# Patient Record
Sex: FEMALE | Race: WHITE | NOT HISPANIC OR LATINO | Employment: OTHER | URBAN - METROPOLITAN AREA
[De-identification: names, ages, dates, MRNs, and addresses within clinical notes are randomized per-mention and may not be internally consistent; named-entity substitution may affect disease eponyms.]

---

## 2018-05-22 ENCOUNTER — LAB VISIT (OUTPATIENT)
Dept: LAB | Facility: HOSPITAL | Age: 73
End: 2018-05-22
Attending: INTERNAL MEDICINE
Payer: MEDICARE

## 2018-05-22 DIAGNOSIS — J44.1 OBSTRUCTIVE CHRONIC BRONCHITIS WITH EXACERBATION: Primary | ICD-10-CM

## 2018-05-22 DIAGNOSIS — E78.5 HYPERLIPEMIA: ICD-10-CM

## 2018-05-22 LAB
ALBUMIN SERPL BCP-MCNC: 4.3 G/DL
ALP SERPL-CCNC: 72 U/L
ALT SERPL W/O P-5'-P-CCNC: 31 U/L
ANION GAP SERPL CALC-SCNC: 10 MMOL/L
AST SERPL-CCNC: 24 U/L
BILIRUB DIRECT SERPL-MCNC: 0.1 MG/DL
BILIRUB SERPL-MCNC: 0.4 MG/DL
BUN SERPL-MCNC: 17 MG/DL
CALCIUM SERPL-MCNC: 8.9 MG/DL
CHLORIDE SERPL-SCNC: 108 MMOL/L
CHOLEST SERPL-MCNC: 181 MG/DL
CHOLEST/HDLC SERPL: 1.5 {RATIO}
CO2 SERPL-SCNC: 22 MMOL/L
CREAT SERPL-MCNC: 0.8 MG/DL
EST. GFR  (AFRICAN AMERICAN): >60 ML/MIN/1.73 M^2
EST. GFR  (NON AFRICAN AMERICAN): >60 ML/MIN/1.73 M^2
GLUCOSE SERPL-MCNC: 98 MG/DL
HDLC SERPL-MCNC: 117 MG/DL
HDLC SERPL: 64.6 %
LDLC SERPL CALC-MCNC: 38.6 MG/DL
NONHDLC SERPL-MCNC: 64 MG/DL
POTASSIUM SERPL-SCNC: 3.2 MMOL/L
PROT SERPL-MCNC: 7.1 G/DL
SODIUM SERPL-SCNC: 140 MMOL/L
TRIGL SERPL-MCNC: 127 MG/DL

## 2018-05-22 PROCEDURE — 80048 BASIC METABOLIC PNL TOTAL CA: CPT

## 2018-05-22 PROCEDURE — 80061 LIPID PANEL: CPT

## 2018-05-22 PROCEDURE — 80076 HEPATIC FUNCTION PANEL: CPT

## 2018-05-22 PROCEDURE — 36415 COLL VENOUS BLD VENIPUNCTURE: CPT

## 2018-05-30 ENCOUNTER — LAB VISIT (OUTPATIENT)
Dept: LAB | Facility: HOSPITAL | Age: 73
End: 2018-05-30
Attending: INTERNAL MEDICINE
Payer: MEDICARE

## 2018-05-30 DIAGNOSIS — J44.1 OBSTRUCTIVE CHRONIC BRONCHITIS WITH EXACERBATION: Primary | ICD-10-CM

## 2018-05-30 LAB
BNP SERPL-MCNC: 112 PG/ML
MAGNESIUM SERPL-MCNC: 1.4 MG/DL
PHENYTOIN SERPL-MCNC: 13.9 UG/ML
POTASSIUM SERPL-SCNC: 3.7 MMOL/L

## 2018-05-30 PROCEDURE — 36415 COLL VENOUS BLD VENIPUNCTURE: CPT

## 2018-05-30 PROCEDURE — 80185 ASSAY OF PHENYTOIN TOTAL: CPT

## 2018-05-30 PROCEDURE — 84132 ASSAY OF SERUM POTASSIUM: CPT

## 2018-05-30 PROCEDURE — 83880 ASSAY OF NATRIURETIC PEPTIDE: CPT

## 2018-05-30 PROCEDURE — 83735 ASSAY OF MAGNESIUM: CPT

## 2018-06-05 ENCOUNTER — LAB VISIT (OUTPATIENT)
Dept: LAB | Facility: HOSPITAL | Age: 73
End: 2018-06-05
Attending: INTERNAL MEDICINE
Payer: MEDICARE

## 2018-06-05 DIAGNOSIS — R19.7 DIARRHEA: Primary | ICD-10-CM

## 2018-06-05 DIAGNOSIS — R10.9 STOMACH ACHE: ICD-10-CM

## 2018-06-05 LAB
BACTERIA #/AREA URNS HPF: ABNORMAL /HPF
BILIRUB UR QL STRIP: NEGATIVE
CLARITY UR: CLEAR
COLOR UR: YELLOW
GLUCOSE UR QL STRIP: NEGATIVE
HGB UR QL STRIP: ABNORMAL
KETONES UR QL STRIP: NEGATIVE
LEUKOCYTE ESTERASE UR QL STRIP: ABNORMAL
MICROSCOPIC COMMENT: ABNORMAL
NITRITE UR QL STRIP: NEGATIVE
NON-SQ EPI CELLS #/AREA URNS HPF: 2 /HPF
PH UR STRIP: 5 [PH] (ref 5–8)
PROT UR QL STRIP: NEGATIVE
RBC #/AREA URNS HPF: 0 /HPF (ref 0–4)
SP GR UR STRIP: 1.01 (ref 1–1.03)
SQUAMOUS #/AREA URNS HPF: 2 /HPF
URN SPEC COLLECT METH UR: ABNORMAL
UROBILINOGEN UR STRIP-ACNC: NEGATIVE EU/DL
WBC #/AREA URNS HPF: 50 /HPF (ref 0–5)

## 2018-06-05 PROCEDURE — 87425 ROTAVIRUS AG IA: CPT

## 2018-06-05 PROCEDURE — 87186 SC STD MICRODIL/AGAR DIL: CPT

## 2018-06-05 PROCEDURE — 87427 SHIGA-LIKE TOXIN AG IA: CPT | Mod: 59

## 2018-06-05 PROCEDURE — 87045 FECES CULTURE AEROBIC BACT: CPT

## 2018-06-05 PROCEDURE — 87088 URINE BACTERIA CULTURE: CPT

## 2018-06-05 PROCEDURE — 87328 CRYPTOSPORIDIUM AG IA: CPT

## 2018-06-05 PROCEDURE — 87046 STOOL CULTR AEROBIC BACT EA: CPT

## 2018-06-05 PROCEDURE — 81000 URINALYSIS NONAUTO W/SCOPE: CPT

## 2018-06-05 PROCEDURE — 87209 SMEAR COMPLEX STAIN: CPT

## 2018-06-05 PROCEDURE — 87086 URINE CULTURE/COLONY COUNT: CPT

## 2018-06-05 PROCEDURE — 87077 CULTURE AEROBIC IDENTIFY: CPT

## 2018-06-05 PROCEDURE — 87449 NOS EACH ORGANISM AG IA: CPT

## 2018-06-05 PROCEDURE — 87301 ADENOVIRUS AG IA: CPT

## 2018-06-06 LAB
C DIFF GDH STL QL: NEGATIVE
C DIFF TOX A+B STL QL IA: NEGATIVE
O+P STL TRI STN: NORMAL
RV AG STL QL IA.RAPID: NEGATIVE

## 2018-06-07 ENCOUNTER — LAB VISIT (OUTPATIENT)
Dept: LAB | Facility: HOSPITAL | Age: 73
End: 2018-06-07
Attending: INTERNAL MEDICINE
Payer: MEDICARE

## 2018-06-07 DIAGNOSIS — D72.10 EOSINOPHILIA: Primary | ICD-10-CM

## 2018-06-07 LAB
CRYPTOSP AG STL QL IA: NEGATIVE
ERYTHROCYTE [DISTWIDTH] IN BLOOD BY AUTOMATED COUNT: 13.5 %
G LAMBLIA AG STL QL IA: NEGATIVE
HCT VFR BLD AUTO: 41.1 %
HGB BLD-MCNC: 13.8 G/DL
MCH RBC QN AUTO: 32.1 PG
MCHC RBC AUTO-ENTMCNC: 33.6 G/DL
MCV RBC AUTO: 96 FL
PLATELET # BLD AUTO: 242 K/UL
PMV BLD AUTO: 9.8 FL
RBC # BLD AUTO: 4.3 M/UL
WBC # BLD AUTO: 7.36 K/UL

## 2018-06-07 PROCEDURE — 36415 COLL VENOUS BLD VENIPUNCTURE: CPT

## 2018-06-07 PROCEDURE — 85027 COMPLETE CBC AUTOMATED: CPT

## 2018-06-08 LAB
BACTERIA UR CULT: NORMAL
E COLI SXT1 STL QL IA: NEGATIVE
E COLI SXT2 STL QL IA: NEGATIVE

## 2018-06-09 LAB — HADV AG STL QL IA: NOT DETECTED

## 2018-06-11 LAB — BACTERIA STL CULT: NORMAL

## 2018-06-19 ENCOUNTER — LAB VISIT (OUTPATIENT)
Dept: LAB | Facility: HOSPITAL | Age: 73
End: 2018-06-19
Attending: INTERNAL MEDICINE
Payer: MEDICARE

## 2018-06-19 DIAGNOSIS — D72.10 EOSINOPHILIA: ICD-10-CM

## 2018-06-19 DIAGNOSIS — E87.6 HYPOPOTASSEMIA: Primary | ICD-10-CM

## 2018-06-19 LAB
ANION GAP SERPL CALC-SCNC: 10 MMOL/L
BNP SERPL-MCNC: 179 PG/ML
BUN SERPL-MCNC: 15 MG/DL
CALCIUM SERPL-MCNC: 9 MG/DL
CHLORIDE SERPL-SCNC: 107 MMOL/L
CO2 SERPL-SCNC: 24 MMOL/L
CREAT SERPL-MCNC: 0.7 MG/DL
ERYTHROCYTE [DISTWIDTH] IN BLOOD BY AUTOMATED COUNT: 13.6 %
EST. GFR  (AFRICAN AMERICAN): >60 ML/MIN/1.73 M^2
EST. GFR  (NON AFRICAN AMERICAN): >60 ML/MIN/1.73 M^2
GLUCOSE SERPL-MCNC: 78 MG/DL
HCT VFR BLD AUTO: 43.6 %
HGB BLD-MCNC: 14.6 G/DL
MAGNESIUM SERPL-MCNC: 1.6 MG/DL
MCH RBC QN AUTO: 32.3 PG
MCHC RBC AUTO-ENTMCNC: 33.5 G/DL
MCV RBC AUTO: 97 FL
PLATELET # BLD AUTO: 255 K/UL
PMV BLD AUTO: 9.7 FL
POTASSIUM SERPL-SCNC: 3.8 MMOL/L
RBC # BLD AUTO: 4.52 M/UL
SODIUM SERPL-SCNC: 141 MMOL/L
WBC # BLD AUTO: 8.4 K/UL

## 2018-06-19 PROCEDURE — 85027 COMPLETE CBC AUTOMATED: CPT

## 2018-06-19 PROCEDURE — 80048 BASIC METABOLIC PNL TOTAL CA: CPT

## 2018-06-19 PROCEDURE — 36415 COLL VENOUS BLD VENIPUNCTURE: CPT

## 2018-06-19 PROCEDURE — 83735 ASSAY OF MAGNESIUM: CPT

## 2018-06-19 PROCEDURE — 83880 ASSAY OF NATRIURETIC PEPTIDE: CPT

## 2018-07-24 ENCOUNTER — LAB VISIT (OUTPATIENT)
Dept: LAB | Facility: HOSPITAL | Age: 73
End: 2018-07-24
Attending: NURSE PRACTITIONER
Payer: MEDICARE

## 2018-07-24 DIAGNOSIS — I50.32 CHRONIC DIASTOLIC HEART FAILURE: ICD-10-CM

## 2018-07-24 DIAGNOSIS — I10 HTN (HYPERTENSION): Primary | ICD-10-CM

## 2018-07-24 LAB — BNP SERPL-MCNC: 85 PG/ML

## 2018-07-24 PROCEDURE — 36415 COLL VENOUS BLD VENIPUNCTURE: CPT

## 2018-07-24 PROCEDURE — 83880 ASSAY OF NATRIURETIC PEPTIDE: CPT

## 2018-08-14 ENCOUNTER — LAB VISIT (OUTPATIENT)
Dept: LAB | Facility: HOSPITAL | Age: 73
End: 2018-08-14
Attending: NURSE PRACTITIONER
Payer: MEDICARE

## 2018-08-14 DIAGNOSIS — E78.00 HYPERCHOLESTEREMIA: ICD-10-CM

## 2018-08-14 DIAGNOSIS — J44.89 CHRONIC BRONCHITIS WITH EMPHYSEMA: ICD-10-CM

## 2018-08-14 DIAGNOSIS — G40.909 EPILEPSY: Primary | ICD-10-CM

## 2018-08-14 LAB
ALBUMIN SERPL BCP-MCNC: 3.9 G/DL
ALBUMIN SERPL BCP-MCNC: 3.9 G/DL
ALP SERPL-CCNC: 78 U/L
ALP SERPL-CCNC: 78 U/L
ALT SERPL W/O P-5'-P-CCNC: 39 U/L
ALT SERPL W/O P-5'-P-CCNC: 39 U/L
ANION GAP SERPL CALC-SCNC: 10 MMOL/L
AST SERPL-CCNC: 32 U/L
AST SERPL-CCNC: 32 U/L
BILIRUB DIRECT SERPL-MCNC: 0.1 MG/DL
BILIRUB SERPL-MCNC: 0.1 MG/DL
BILIRUB SERPL-MCNC: 0.1 MG/DL
BUN SERPL-MCNC: 15 MG/DL
CALCIUM SERPL-MCNC: 9 MG/DL
CHLORIDE SERPL-SCNC: 108 MMOL/L
CHOLEST SERPL-MCNC: 168 MG/DL
CHOLEST/HDLC SERPL: 1.8 {RATIO}
CO2 SERPL-SCNC: 23 MMOL/L
CREAT SERPL-MCNC: 0.6 MG/DL
ERYTHROCYTE [DISTWIDTH] IN BLOOD BY AUTOMATED COUNT: 14 %
EST. GFR  (AFRICAN AMERICAN): >60 ML/MIN/1.73 M^2
EST. GFR  (NON AFRICAN AMERICAN): >60 ML/MIN/1.73 M^2
GLUCOSE SERPL-MCNC: 108 MG/DL
HCT VFR BLD AUTO: 41.3 %
HDLC SERPL-MCNC: 95 MG/DL
HDLC SERPL: 56.5 %
HGB BLD-MCNC: 13.6 G/DL
LDLC SERPL CALC-MCNC: 40.4 MG/DL
MCH RBC QN AUTO: 33 PG
MCHC RBC AUTO-ENTMCNC: 32.9 G/DL
MCV RBC AUTO: 100 FL
NONHDLC SERPL-MCNC: 73 MG/DL
PHENYTOIN SERPL-MCNC: 14.9 UG/ML
PLATELET # BLD AUTO: 304 K/UL
PMV BLD AUTO: 9.7 FL
POTASSIUM SERPL-SCNC: 3.6 MMOL/L
PROT SERPL-MCNC: 7 G/DL
PROT SERPL-MCNC: 7 G/DL
RBC # BLD AUTO: 4.12 M/UL
SODIUM SERPL-SCNC: 141 MMOL/L
TRIGL SERPL-MCNC: 163 MG/DL
WBC # BLD AUTO: 7.22 K/UL

## 2018-08-14 PROCEDURE — 36415 COLL VENOUS BLD VENIPUNCTURE: CPT

## 2018-08-14 PROCEDURE — 80053 COMPREHEN METABOLIC PANEL: CPT

## 2018-08-14 PROCEDURE — 80185 ASSAY OF PHENYTOIN TOTAL: CPT

## 2018-08-14 PROCEDURE — 85027 COMPLETE CBC AUTOMATED: CPT

## 2018-08-14 PROCEDURE — 80061 LIPID PANEL: CPT

## 2018-10-01 ENCOUNTER — APPOINTMENT (OUTPATIENT)
Dept: LAB | Facility: HOSPITAL | Age: 73
End: 2018-10-01
Attending: INTERNAL MEDICINE
Payer: MEDICARE

## 2018-10-01 DIAGNOSIS — N39.0 UTI (URINARY TRACT INFECTION): Primary | ICD-10-CM

## 2018-10-01 LAB
BILIRUB UR QL STRIP: NEGATIVE
CLARITY UR: CLEAR
COLOR UR: YELLOW
GLUCOSE UR QL STRIP: NEGATIVE
HGB UR QL STRIP: NEGATIVE
KETONES UR QL STRIP: NEGATIVE
LEUKOCYTE ESTERASE UR QL STRIP: NEGATIVE
NITRITE UR QL STRIP: NEGATIVE
PH UR STRIP: 6 [PH] (ref 5–8)
PROT UR QL STRIP: NEGATIVE
SP GR UR STRIP: <=1.005 (ref 1–1.03)
URN SPEC COLLECT METH UR: NORMAL
UROBILINOGEN UR STRIP-ACNC: NEGATIVE EU/DL

## 2018-10-01 PROCEDURE — 81003 URINALYSIS AUTO W/O SCOPE: CPT

## 2018-10-01 PROCEDURE — 87086 URINE CULTURE/COLONY COUNT: CPT

## 2018-10-03 LAB — BACTERIA UR CULT: NORMAL

## 2018-11-06 ENCOUNTER — LAB VISIT (OUTPATIENT)
Dept: LAB | Facility: HOSPITAL | Age: 73
End: 2018-11-06
Attending: INTERNAL MEDICINE
Payer: MEDICARE

## 2018-11-06 DIAGNOSIS — J44.89 OBSTRUCTIVE CHRONIC BRONCHITIS WITHOUT EXACERBATION: ICD-10-CM

## 2018-11-06 DIAGNOSIS — G40.901 STATUS EPILEPTICUS: Primary | ICD-10-CM

## 2018-11-06 DIAGNOSIS — E78.5 HYPERLIPEMIA: ICD-10-CM

## 2018-11-06 LAB
ALBUMIN SERPL BCP-MCNC: 4 G/DL
ALP SERPL-CCNC: 79 U/L
ALT SERPL W/O P-5'-P-CCNC: 28 U/L
ANION GAP SERPL CALC-SCNC: 9 MMOL/L
AST SERPL-CCNC: 27 U/L
BILIRUB DIRECT SERPL-MCNC: 0.1 MG/DL
BILIRUB SERPL-MCNC: 0.3 MG/DL
BUN SERPL-MCNC: 16 MG/DL
CALCIUM SERPL-MCNC: 8.7 MG/DL
CHLORIDE SERPL-SCNC: 109 MMOL/L
CHOLEST SERPL-MCNC: 176 MG/DL
CHOLEST/HDLC SERPL: 1.6 {RATIO}
CO2 SERPL-SCNC: 23 MMOL/L
CREAT SERPL-MCNC: 0.7 MG/DL
EST. GFR  (AFRICAN AMERICAN): >60 ML/MIN/1.73 M^2
EST. GFR  (NON AFRICAN AMERICAN): >60 ML/MIN/1.73 M^2
GLUCOSE SERPL-MCNC: 73 MG/DL
HDLC SERPL-MCNC: 108 MG/DL
HDLC SERPL: 61.4 %
LDLC SERPL CALC-MCNC: 51.2 MG/DL
NONHDLC SERPL-MCNC: 68 MG/DL
PHENYTOIN SERPL-MCNC: 6.9 UG/ML
POTASSIUM SERPL-SCNC: 3.2 MMOL/L
PROT SERPL-MCNC: 6.9 G/DL
SODIUM SERPL-SCNC: 141 MMOL/L
TRIGL SERPL-MCNC: 84 MG/DL

## 2018-11-06 PROCEDURE — 80061 LIPID PANEL: CPT

## 2018-11-06 PROCEDURE — 80185 ASSAY OF PHENYTOIN TOTAL: CPT

## 2018-11-06 PROCEDURE — 80076 HEPATIC FUNCTION PANEL: CPT

## 2018-11-06 PROCEDURE — 36415 COLL VENOUS BLD VENIPUNCTURE: CPT

## 2018-11-06 PROCEDURE — 80048 BASIC METABOLIC PNL TOTAL CA: CPT

## 2018-11-08 ENCOUNTER — APPOINTMENT (OUTPATIENT)
Dept: LAB | Facility: HOSPITAL | Age: 73
End: 2018-11-08
Attending: INTERNAL MEDICINE
Payer: MEDICARE

## 2018-11-08 DIAGNOSIS — N39.0 URINARY TRACT INFECTION, SITE NOT SPECIFIED: Primary | ICD-10-CM

## 2018-11-08 LAB
BACTERIA #/AREA URNS HPF: ABNORMAL /HPF
BILIRUB UR QL STRIP: NEGATIVE
CLARITY UR: ABNORMAL
COLOR UR: YELLOW
GLUCOSE UR QL STRIP: NEGATIVE
HGB UR QL STRIP: ABNORMAL
HYALINE CASTS #/AREA URNS LPF: 0 /LPF
KETONES UR QL STRIP: ABNORMAL
LEUKOCYTE ESTERASE UR QL STRIP: ABNORMAL
MICROSCOPIC COMMENT: ABNORMAL
NITRITE UR QL STRIP: POSITIVE
PH UR STRIP: 6 [PH] (ref 5–8)
PROT UR QL STRIP: ABNORMAL
RBC #/AREA URNS HPF: 10 /HPF (ref 0–4)
SP GR UR STRIP: >1.03 (ref 1–1.03)
URN SPEC COLLECT METH UR: ABNORMAL
UROBILINOGEN UR STRIP-ACNC: NEGATIVE EU/DL
WBC #/AREA URNS HPF: >100 /HPF (ref 0–5)

## 2018-11-08 PROCEDURE — 87186 SC STD MICRODIL/AGAR DIL: CPT

## 2018-11-08 PROCEDURE — 81000 URINALYSIS NONAUTO W/SCOPE: CPT

## 2018-11-08 PROCEDURE — 87086 URINE CULTURE/COLONY COUNT: CPT

## 2018-11-08 PROCEDURE — 87077 CULTURE AEROBIC IDENTIFY: CPT

## 2018-11-08 PROCEDURE — 87088 URINE BACTERIA CULTURE: CPT

## 2018-11-10 LAB — BACTERIA UR CULT: NORMAL

## 2019-02-19 ENCOUNTER — LAB VISIT (OUTPATIENT)
Dept: LAB | Facility: HOSPITAL | Age: 74
End: 2019-02-19
Attending: NURSE PRACTITIONER
Payer: MEDICARE

## 2019-02-19 DIAGNOSIS — R53.1 ASTHENIA: ICD-10-CM

## 2019-02-19 DIAGNOSIS — R53.81 DEBILITY: Primary | ICD-10-CM

## 2019-02-19 LAB
ERYTHROCYTE [DISTWIDTH] IN BLOOD BY AUTOMATED COUNT: 13.3 %
HCT VFR BLD AUTO: 41.8 %
HGB BLD-MCNC: 13.7 G/DL
MCH RBC QN AUTO: 32.4 PG
MCHC RBC AUTO-ENTMCNC: 32.8 G/DL
MCV RBC AUTO: 99 FL
PLATELET # BLD AUTO: 238 K/UL
PMV BLD AUTO: 9.5 FL
RBC # BLD AUTO: 4.23 M/UL
WBC # BLD AUTO: 6.95 K/UL

## 2019-02-19 PROCEDURE — 85027 COMPLETE CBC AUTOMATED: CPT

## 2019-02-19 PROCEDURE — 36415 COLL VENOUS BLD VENIPUNCTURE: CPT

## 2019-02-21 ENCOUNTER — LAB VISIT (OUTPATIENT)
Dept: LAB | Facility: HOSPITAL | Age: 74
End: 2019-02-21
Attending: INTERNAL MEDICINE
Payer: MEDICARE

## 2019-02-21 DIAGNOSIS — G40.89 SOMATOSENSORY ATTACKS: ICD-10-CM

## 2019-02-21 DIAGNOSIS — K21.9 ESOPHAGEAL REFLUX: Primary | ICD-10-CM

## 2019-02-21 DIAGNOSIS — E78.5 HYPERLIPEMIA: ICD-10-CM

## 2019-02-21 LAB
ALBUMIN SERPL BCP-MCNC: 4 G/DL
ALP SERPL-CCNC: 82 U/L
ALT SERPL W/O P-5'-P-CCNC: 20 U/L
ANION GAP SERPL CALC-SCNC: 13 MMOL/L
AST SERPL-CCNC: 23 U/L
BILIRUB DIRECT SERPL-MCNC: 0.1 MG/DL
BILIRUB SERPL-MCNC: 0.5 MG/DL
BUN SERPL-MCNC: 13 MG/DL
CALCIUM SERPL-MCNC: 8.7 MG/DL
CHLORIDE SERPL-SCNC: 102 MMOL/L
CHOLEST SERPL-MCNC: 202 MG/DL
CHOLEST/HDLC SERPL: 2 {RATIO}
CO2 SERPL-SCNC: 26 MMOL/L
CREAT SERPL-MCNC: 0.8 MG/DL
EST. GFR  (AFRICAN AMERICAN): >60 ML/MIN/1.73 M^2
EST. GFR  (NON AFRICAN AMERICAN): >60 ML/MIN/1.73 M^2
GLUCOSE SERPL-MCNC: 114 MG/DL
HDLC SERPL-MCNC: 101 MG/DL
HDLC SERPL: 50 %
LDLC SERPL CALC-MCNC: 71.6 MG/DL
NONHDLC SERPL-MCNC: 101 MG/DL
PHENYTOIN SERPL-MCNC: 14.4 UG/ML
POTASSIUM SERPL-SCNC: 2.9 MMOL/L
PROT SERPL-MCNC: 6.9 G/DL
SODIUM SERPL-SCNC: 141 MMOL/L
TRIGL SERPL-MCNC: 147 MG/DL

## 2019-02-21 PROCEDURE — 80061 LIPID PANEL: CPT

## 2019-02-21 PROCEDURE — 80076 HEPATIC FUNCTION PANEL: CPT

## 2019-02-21 PROCEDURE — 36415 COLL VENOUS BLD VENIPUNCTURE: CPT

## 2019-02-21 PROCEDURE — 80185 ASSAY OF PHENYTOIN TOTAL: CPT

## 2019-02-21 PROCEDURE — 80048 BASIC METABOLIC PNL TOTAL CA: CPT

## 2019-02-28 ENCOUNTER — LAB VISIT (OUTPATIENT)
Dept: LAB | Facility: HOSPITAL | Age: 74
End: 2019-02-28
Attending: NURSE PRACTITIONER
Payer: MEDICARE

## 2019-02-28 DIAGNOSIS — E87.6 HYPOPOTASSEMIA: Primary | ICD-10-CM

## 2019-02-28 LAB
ANION GAP SERPL CALC-SCNC: 10 MMOL/L
BUN SERPL-MCNC: 13 MG/DL
CALCIUM SERPL-MCNC: 8.3 MG/DL
CHLORIDE SERPL-SCNC: 107 MMOL/L
CO2 SERPL-SCNC: 22 MMOL/L
CREAT SERPL-MCNC: 0.6 MG/DL
EST. GFR  (AFRICAN AMERICAN): >60 ML/MIN/1.73 M^2
EST. GFR  (NON AFRICAN AMERICAN): >60 ML/MIN/1.73 M^2
GLUCOSE SERPL-MCNC: 84 MG/DL
POTASSIUM SERPL-SCNC: 3.4 MMOL/L
SODIUM SERPL-SCNC: 139 MMOL/L

## 2019-02-28 PROCEDURE — 36415 COLL VENOUS BLD VENIPUNCTURE: CPT

## 2019-02-28 PROCEDURE — 80048 BASIC METABOLIC PNL TOTAL CA: CPT

## 2019-03-06 ENCOUNTER — APPOINTMENT (OUTPATIENT)
Dept: LAB | Facility: HOSPITAL | Age: 74
End: 2019-03-06
Attending: INTERNAL MEDICINE
Payer: MEDICARE

## 2019-03-06 LAB
BILIRUB UR QL STRIP: NEGATIVE
CLARITY UR: CLEAR
COLOR UR: YELLOW
GLUCOSE UR QL STRIP: NEGATIVE
HGB UR QL STRIP: NEGATIVE
KETONES UR QL STRIP: NEGATIVE
LEUKOCYTE ESTERASE UR QL STRIP: NEGATIVE
NITRITE UR QL STRIP: NEGATIVE
PH UR STRIP: 6 [PH] (ref 5–8)
PROT UR QL STRIP: ABNORMAL
SP GR UR STRIP: >=1.03 (ref 1–1.03)
URN SPEC COLLECT METH UR: ABNORMAL
UROBILINOGEN UR STRIP-ACNC: NEGATIVE EU/DL

## 2019-03-06 PROCEDURE — 87086 URINE CULTURE/COLONY COUNT: CPT

## 2019-03-06 PROCEDURE — 81003 URINALYSIS AUTO W/O SCOPE: CPT

## 2019-03-07 ENCOUNTER — LAB VISIT (OUTPATIENT)
Dept: LAB | Facility: HOSPITAL | Age: 74
End: 2019-03-07
Attending: NURSE PRACTITIONER
Payer: MEDICARE

## 2019-03-07 DIAGNOSIS — E87.6 HYPOPOTASSEMIA: Primary | ICD-10-CM

## 2019-03-07 LAB
ANION GAP SERPL CALC-SCNC: 14 MMOL/L
BUN SERPL-MCNC: 14 MG/DL
CALCIUM SERPL-MCNC: 8.5 MG/DL
CHLORIDE SERPL-SCNC: 106 MMOL/L
CO2 SERPL-SCNC: 21 MMOL/L
CREAT SERPL-MCNC: 0.6 MG/DL
EST. GFR  (AFRICAN AMERICAN): >60 ML/MIN/1.73 M^2
EST. GFR  (NON AFRICAN AMERICAN): >60 ML/MIN/1.73 M^2
GLUCOSE SERPL-MCNC: 82 MG/DL
POTASSIUM SERPL-SCNC: 3.6 MMOL/L
SODIUM SERPL-SCNC: 141 MMOL/L

## 2019-03-07 PROCEDURE — 80048 BASIC METABOLIC PNL TOTAL CA: CPT

## 2019-03-07 PROCEDURE — 36415 COLL VENOUS BLD VENIPUNCTURE: CPT

## 2019-03-08 LAB
BACTERIA UR CULT: NORMAL
BACTERIA UR CULT: NORMAL

## 2019-03-13 ENCOUNTER — LAB VISIT (OUTPATIENT)
Dept: LAB | Facility: HOSPITAL | Age: 74
End: 2019-03-13
Attending: NURSE PRACTITIONER
Payer: MEDICARE

## 2019-03-13 DIAGNOSIS — R76.12 NONSPECIFIC REACTION TO CELL MEDIATED IMMUNITY MEASUREMENT OF GAMMA INTERFERON ANTIGEN RESPONSE WITHOUT ACTIVE TUBERCULOSIS: ICD-10-CM

## 2019-03-13 DIAGNOSIS — J43.9 EMPHYSEMATOUS BLEB: Primary | ICD-10-CM

## 2019-03-13 DIAGNOSIS — J44.89 OBSTRUCTIVE CHRONIC BRONCHITIS WITHOUT EXACERBATION: ICD-10-CM

## 2019-03-13 PROCEDURE — 87502 INFLUENZA DNA AMP PROBE: CPT

## 2019-03-14 ENCOUNTER — LAB VISIT (OUTPATIENT)
Dept: LAB | Facility: HOSPITAL | Age: 74
End: 2019-03-14
Attending: NURSE PRACTITIONER
Payer: MEDICARE

## 2019-03-14 DIAGNOSIS — T78.40XA DILATED CARDIOMYOPATHY SECONDARY TO SENSITIVITY: ICD-10-CM

## 2019-03-14 DIAGNOSIS — J43.9 EMPHYSEMATOUS BLEB: Primary | ICD-10-CM

## 2019-03-14 DIAGNOSIS — R76.12 NONSPECIFIC REACTION TO CELL MEDIATED IMMUNITY MEASUREMENT OF GAMMA INTERFERON ANTIGEN RESPONSE WITHOUT ACTIVE TUBERCULOSIS: ICD-10-CM

## 2019-03-14 DIAGNOSIS — I43 DILATED CARDIOMYOPATHY SECONDARY TO SENSITIVITY: ICD-10-CM

## 2019-03-14 DIAGNOSIS — J44.89 OBSTRUCTIVE CHRONIC BRONCHITIS WITHOUT EXACERBATION: ICD-10-CM

## 2019-03-14 LAB
BNP SERPL-MCNC: 169 PG/ML
INFLUENZA A, MOLECULAR: NEGATIVE
INFLUENZA B, MOLECULAR: NEGATIVE
SPECIMEN SOURCE: NORMAL

## 2019-03-14 PROCEDURE — 36415 COLL VENOUS BLD VENIPUNCTURE: CPT

## 2019-03-14 PROCEDURE — 83880 ASSAY OF NATRIURETIC PEPTIDE: CPT

## 2019-03-21 ENCOUNTER — LAB VISIT (OUTPATIENT)
Dept: LAB | Facility: HOSPITAL | Age: 74
End: 2019-03-21
Attending: INTERNAL MEDICINE
Payer: MEDICARE

## 2019-03-21 DIAGNOSIS — N39.0 URINARY TRACT INFECTION, SITE NOT SPECIFIED: Primary | ICD-10-CM

## 2019-03-21 LAB
AMORPH CRY URNS QL MICRO: ABNORMAL
BACTERIA #/AREA URNS HPF: ABNORMAL /HPF
BILIRUB UR QL STRIP: NEGATIVE
CLARITY UR: CLEAR
COLOR UR: YELLOW
GLUCOSE UR QL STRIP: NEGATIVE
HGB UR QL STRIP: NEGATIVE
HYALINE CASTS #/AREA URNS LPF: 3 /LPF
KETONES UR QL STRIP: NEGATIVE
LEUKOCYTE ESTERASE UR QL STRIP: ABNORMAL
MICROSCOPIC COMMENT: ABNORMAL
NITRITE UR QL STRIP: NEGATIVE
NON-SQ EPI CELLS #/AREA URNS HPF: 3 /HPF
PH UR STRIP: 5 [PH] (ref 5–8)
PROT UR QL STRIP: NEGATIVE
SP GR UR STRIP: 1.01 (ref 1–1.03)
URN SPEC COLLECT METH UR: ABNORMAL
UROBILINOGEN UR STRIP-ACNC: NEGATIVE EU/DL
WBC #/AREA URNS HPF: 3 /HPF (ref 0–5)

## 2019-03-21 PROCEDURE — 87086 URINE CULTURE/COLONY COUNT: CPT

## 2019-03-21 PROCEDURE — 81000 URINALYSIS NONAUTO W/SCOPE: CPT

## 2019-03-22 LAB — BACTERIA UR CULT: NO GROWTH

## 2019-03-23 ENCOUNTER — HOSPITAL ENCOUNTER (EMERGENCY)
Facility: HOSPITAL | Age: 74
Discharge: HOME | End: 2019-03-23
Attending: EMERGENCY MEDICINE
Payer: MEDICARE

## 2019-03-23 VITALS
WEIGHT: 102.38 LBS | HEIGHT: 63 IN | BODY MASS INDEX: 18.14 KG/M2 | DIASTOLIC BLOOD PRESSURE: 56 MMHG | RESPIRATION RATE: 13 BRPM | OXYGEN SATURATION: 96 % | HEART RATE: 90 BPM | SYSTOLIC BLOOD PRESSURE: 113 MMHG | TEMPERATURE: 99 F

## 2019-03-23 DIAGNOSIS — D72.829 LEUKOCYTOSIS, UNSPECIFIED TYPE: ICD-10-CM

## 2019-03-23 DIAGNOSIS — R11.0 NAUSEA: ICD-10-CM

## 2019-03-23 DIAGNOSIS — J18.9 PNEUMONIA OF RIGHT LOWER LOBE DUE TO INFECTIOUS ORGANISM: Primary | ICD-10-CM

## 2019-03-23 LAB
ALBUMIN SERPL BCP-MCNC: 3.6 G/DL
ALP SERPL-CCNC: 71 U/L
ALT SERPL W/O P-5'-P-CCNC: 19 U/L
ANION GAP SERPL CALC-SCNC: 10 MMOL/L
APTT BLDCRRT: 27.9 SEC
AST SERPL-CCNC: 16 U/L
BASOPHILS # BLD AUTO: 0.04 K/UL
BASOPHILS NFR BLD: 0.2 %
BILIRUB SERPL-MCNC: 1 MG/DL
BNP SERPL-MCNC: 705 PG/ML
BUN SERPL-MCNC: 26 MG/DL
CALCIUM SERPL-MCNC: 8.5 MG/DL
CHLORIDE SERPL-SCNC: 97 MMOL/L
CO2 SERPL-SCNC: 24 MMOL/L
CREAT SERPL-MCNC: 1 MG/DL
DIFFERENTIAL METHOD: ABNORMAL
EOSINOPHIL # BLD AUTO: 0 K/UL
EOSINOPHIL NFR BLD: 0.1 %
ERYTHROCYTE [DISTWIDTH] IN BLOOD BY AUTOMATED COUNT: 13.1 %
EST. GFR  (AFRICAN AMERICAN): >60 ML/MIN/1.73 M^2
EST. GFR  (NON AFRICAN AMERICAN): 55.2 ML/MIN/1.73 M^2
GLUCOSE SERPL-MCNC: 111 MG/DL
HCT VFR BLD AUTO: 42.1 %
HGB BLD-MCNC: 13.8 G/DL
IMM GRANULOCYTES # BLD AUTO: 0.16 K/UL
IMM GRANULOCYTES NFR BLD AUTO: 0.7 %
INFLUENZA A, MOLECULAR: NEGATIVE
INFLUENZA B, MOLECULAR: NEGATIVE
INR PPP: 1.3
LACTATE SERPL-SCNC: 1.8 MMOL/L
LIPASE SERPL-CCNC: 23 U/L
LYMPHOCYTES # BLD AUTO: 1.7 K/UL
LYMPHOCYTES NFR BLD: 7.5 %
MAGNESIUM SERPL-MCNC: 1.7 MG/DL
MCH RBC QN AUTO: 32.5 PG
MCHC RBC AUTO-ENTMCNC: 32.8 G/DL
MCV RBC AUTO: 99 FL
MONOCYTES # BLD AUTO: 1.1 K/UL
MONOCYTES NFR BLD: 4.7 %
NEUTROPHILS # BLD AUTO: 19.5 K/UL
NEUTROPHILS NFR BLD: 86.8 %
NRBC BLD-RTO: 0 /100 WBC
PLATELET # BLD AUTO: 249 K/UL
PMV BLD AUTO: 9 FL
POTASSIUM SERPL-SCNC: 3.5 MMOL/L
PROT SERPL-MCNC: 7 G/DL
PROTHROMBIN TIME: 14.2 SEC
RBC # BLD AUTO: 4.25 M/UL
SODIUM SERPL-SCNC: 131 MMOL/L
SPECIMEN SOURCE: NORMAL
TROPONIN I SERPL DL<=0.01 NG/ML-MCNC: 0.06 NG/ML
WBC # BLD AUTO: 22.46 K/UL

## 2019-03-23 PROCEDURE — 71045 X-RAY EXAM CHEST 1 VIEW: CPT | Mod: TC,FY

## 2019-03-23 PROCEDURE — 99285 EMERGENCY DEPT VISIT HI MDM: CPT | Mod: 25

## 2019-03-23 PROCEDURE — 85610 PROTHROMBIN TIME: CPT

## 2019-03-23 PROCEDURE — 71045 XR CHEST AP PORTABLE: ICD-10-PCS | Mod: 26,,, | Performed by: RADIOLOGY

## 2019-03-23 PROCEDURE — 93005 ELECTROCARDIOGRAM TRACING: CPT

## 2019-03-23 PROCEDURE — 87040 BLOOD CULTURE FOR BACTERIA: CPT

## 2019-03-23 PROCEDURE — 80053 COMPREHEN METABOLIC PANEL: CPT

## 2019-03-23 PROCEDURE — 74176 CT ABD & PELVIS W/O CONTRAST: CPT | Mod: 26,,, | Performed by: RADIOLOGY

## 2019-03-23 PROCEDURE — 25000003 PHARM REV CODE 250: Performed by: EMERGENCY MEDICINE

## 2019-03-23 PROCEDURE — 87502 INFLUENZA DNA AMP PROBE: CPT

## 2019-03-23 PROCEDURE — 96361 HYDRATE IV INFUSION ADD-ON: CPT

## 2019-03-23 PROCEDURE — 96365 THER/PROPH/DIAG IV INF INIT: CPT

## 2019-03-23 PROCEDURE — 84484 ASSAY OF TROPONIN QUANT: CPT

## 2019-03-23 PROCEDURE — 85730 THROMBOPLASTIN TIME PARTIAL: CPT

## 2019-03-23 PROCEDURE — 63600175 PHARM REV CODE 636 W HCPCS: Performed by: EMERGENCY MEDICINE

## 2019-03-23 PROCEDURE — 74176 CT ABD & PELVIS W/O CONTRAST: CPT | Mod: TC

## 2019-03-23 PROCEDURE — 83880 ASSAY OF NATRIURETIC PEPTIDE: CPT

## 2019-03-23 PROCEDURE — 74176 CT RENAL STONE STUDY ABD PELVIS WO: ICD-10-PCS | Mod: 26,,, | Performed by: RADIOLOGY

## 2019-03-23 PROCEDURE — 83690 ASSAY OF LIPASE: CPT

## 2019-03-23 PROCEDURE — 36415 COLL VENOUS BLD VENIPUNCTURE: CPT

## 2019-03-23 PROCEDURE — 85025 COMPLETE CBC W/AUTO DIFF WBC: CPT

## 2019-03-23 PROCEDURE — 71045 X-RAY EXAM CHEST 1 VIEW: CPT | Mod: 26,,, | Performed by: RADIOLOGY

## 2019-03-23 PROCEDURE — 83735 ASSAY OF MAGNESIUM: CPT

## 2019-03-23 PROCEDURE — 83605 ASSAY OF LACTIC ACID: CPT

## 2019-03-23 RX ORDER — IPRATROPIUM BROMIDE AND ALBUTEROL SULFATE 2.5; .5 MG/3ML; MG/3ML
3 SOLUTION RESPIRATORY (INHALATION) EVERY 6 HOURS PRN
COMMUNITY

## 2019-03-23 RX ORDER — EZETIMIBE 10 MG/1
10 TABLET ORAL DAILY
COMMUNITY

## 2019-03-23 RX ORDER — FERROUS SULFATE, DRIED 160(50) MG
1 TABLET, EXTENDED RELEASE ORAL 2 TIMES DAILY WITH MEALS
COMMUNITY

## 2019-03-23 RX ORDER — ESCITALOPRAM OXALATE 5 MG/1
5 TABLET ORAL DAILY
COMMUNITY

## 2019-03-23 RX ORDER — POTASSIUM CHLORIDE 20 MEQ/1
40 TABLET, EXTENDED RELEASE ORAL 2 TIMES DAILY
COMMUNITY

## 2019-03-23 RX ORDER — LORATADINE 10 MG/1
10 TABLET ORAL DAILY
COMMUNITY

## 2019-03-23 RX ORDER — DONEPEZIL HYDROCHLORIDE 10 MG/1
10 TABLET, FILM COATED ORAL NIGHTLY
COMMUNITY

## 2019-03-23 RX ORDER — CYCLOBENZAPRINE HCL 5 MG
10 TABLET ORAL
Status: DISCONTINUED | OUTPATIENT
Start: 2019-03-23 | End: 2019-03-23

## 2019-03-23 RX ORDER — HYDROCODONE BITARTRATE AND ACETAMINOPHEN 5; 325 MG/1; MG/1
1 TABLET ORAL
Status: DISCONTINUED | OUTPATIENT
Start: 2019-03-23 | End: 2019-03-23

## 2019-03-23 RX ORDER — ALPRAZOLAM 0.25 MG/1
TABLET ORAL 3 TIMES DAILY
COMMUNITY

## 2019-03-23 RX ORDER — TRAMADOL HYDROCHLORIDE 50 MG/1
50 TABLET ORAL EVERY 6 HOURS PRN
COMMUNITY

## 2019-03-23 RX ORDER — ACETAMINOPHEN 325 MG/1
650 TABLET ORAL EVERY 6 HOURS PRN
COMMUNITY

## 2019-03-23 RX ORDER — GABAPENTIN 100 MG/1
100 CAPSULE ORAL 3 TIMES DAILY
COMMUNITY

## 2019-03-23 RX ORDER — LEVOFLOXACIN 5 MG/ML
750 INJECTION, SOLUTION INTRAVENOUS
Status: COMPLETED | OUTPATIENT
Start: 2019-03-23 | End: 2019-03-23

## 2019-03-23 RX ORDER — ESCITALOPRAM OXALATE 10 MG/1
15 TABLET ORAL DAILY
COMMUNITY

## 2019-03-23 RX ORDER — FUROSEMIDE 20 MG/1
20 TABLET ORAL 2 TIMES DAILY
COMMUNITY

## 2019-03-23 RX ORDER — PHENYTOIN 125 MG/5ML
130 SUSPENSION ORAL NIGHTLY
COMMUNITY

## 2019-03-23 RX ORDER — MIDODRINE HYDROCHLORIDE 2.5 MG/1
2.5 TABLET ORAL 3 TIMES DAILY
COMMUNITY

## 2019-03-23 RX ORDER — LIDOCAINE 560 MG/1
PATCH PERCUTANEOUS; TOPICAL; TRANSDERMAL
COMMUNITY

## 2019-03-23 RX ORDER — LEVOFLOXACIN 500 MG/1
500 TABLET, FILM COATED ORAL DAILY
Qty: 7 TABLET | Refills: 0 | Status: SHIPPED | OUTPATIENT
Start: 2019-03-23 | End: 2019-03-30

## 2019-03-23 RX ORDER — LOPERAMIDE HCL 2 MG
2 TABLET ORAL 4 TIMES DAILY PRN
COMMUNITY

## 2019-03-23 RX ORDER — PHENYTOIN 125 MG/5ML
100 SUSPENSION ORAL EVERY MORNING
COMMUNITY

## 2019-03-23 RX ORDER — DIPHENOXYLATE HYDROCHLORIDE AND ATROPINE SULFATE 2.5; .025 MG/1; MG/1
1 TABLET ORAL 4 TIMES DAILY PRN
COMMUNITY

## 2019-03-23 RX ORDER — LANOLIN ALCOHOL/MO/W.PET/CERES
400 CREAM (GRAM) TOPICAL DAILY
COMMUNITY

## 2019-03-23 RX ORDER — FLUTICASONE PROPIONATE 50 MCG
1 SPRAY, SUSPENSION (ML) NASAL DAILY
COMMUNITY

## 2019-03-23 RX ORDER — CLONIDINE 0.1 MG/24H
1 PATCH, EXTENDED RELEASE TRANSDERMAL
COMMUNITY

## 2019-03-23 RX ORDER — POLYVINYL ALCOHOL 14 MG/ML
1 SOLUTION/ DROPS OPHTHALMIC
COMMUNITY

## 2019-03-23 RX ORDER — ONDANSETRON 4 MG/1
4 TABLET, FILM COATED ORAL EVERY 8 HOURS PRN
COMMUNITY

## 2019-03-23 RX ADMIN — SODIUM CHLORIDE 1392 ML: 9 INJECTION, SOLUTION INTRAVENOUS at 01:03

## 2019-03-23 RX ADMIN — LEVOFLOXACIN 750 MG: 750 INJECTION, SOLUTION INTRAVENOUS at 03:03

## 2019-03-23 NOTE — ED NOTES
Spoke with JOSE Esparza at [facility] regarding patient discharge. JOSE Esparza states that she would have to make a few phone calls regarding transportation and would call [facility] back with further information.

## 2019-03-27 NOTE — ED PROVIDER NOTES
Encounter Date: 3/23/2019       History     Chief Complaint   Patient presents with    Abdominal Pain    Nausea    Diarrhea     74yo female presents to ED for evaluation of intermittent, non-radiating, crampy diffuse abdominal pain with associated nausea and diarrhea. Denies fever, chills, vomiting, chest pain, back pain.         Review of patient's allergies indicates:   Allergen Reactions    Coumadin [warfarin]     Tetanus vaccines and toxoid      Past Medical History:   Diagnosis Date    Age related osteoporosis     Anxiety disorder due to known physiological condition     Central retinal vein occlusion, unspecified eye, with retinal neovascularization     Chronic kidney disease     Diarrhea, unspecified     Emphysema, unspecified     Epilepsy     GERD (gastroesophageal reflux disease)     Hyperlipidemia     Low back pain     Moderate protein-calorie malnutrition     Other forms of dyspnea     Other seizures     Personal history of (healed) traumatic fracture     Personal history of other diseases of the respiratory system     Personal history of urinary (tract) infections     Postgastric surgery syndromes     Postsurgical malabsorption, not elsewhere classified     Retinal ischemia     Shortness of breath     Tachycardia, unspecified     Unspecified dementia without behavioral disturbance      Past Surgical History:   Procedure Laterality Date    CORONARY STENT PLACEMENT       History reviewed. No pertinent family history.  Social History     Tobacco Use    Smoking status: Never Smoker   Substance Use Topics    Alcohol use: No     Frequency: Never    Drug use: Not on file     Review of Systems   Constitutional: Negative for chills, diaphoresis, fatigue and fever.   HENT: Negative for congestion, ear pain, rhinorrhea, sinus pressure, sinus pain, sore throat and tinnitus.    Eyes: Negative for photophobia, pain and visual disturbance.   Respiratory: Positive for cough. Negative for  apnea, chest tightness, shortness of breath and wheezing.    Cardiovascular: Negative for chest pain, palpitations and leg swelling.   Gastrointestinal: Positive for abdominal pain, diarrhea and nausea. Negative for abdominal distention, constipation and vomiting.   Endocrine: Negative for cold intolerance, heat intolerance, polydipsia, polyphagia and polyuria.   Genitourinary: Negative for decreased urine volume, difficulty urinating, dysuria, flank pain, frequency, pelvic pain and urgency.   Musculoskeletal: Negative for arthralgias, back pain, gait problem, joint swelling, myalgias, neck pain and neck stiffness.   Skin: Negative for color change, pallor, rash and wound.   Neurological: Negative for dizziness, syncope, weakness, light-headedness, numbness and headaches.   All other systems reviewed and are negative.      Physical Exam     Initial Vitals   BP Pulse Resp Temp SpO2   03/23/19 1322 03/23/19 1322 03/23/19 1322 03/23/19 1322 03/23/19 1314   (!) 144/71 73 18 98.8 °F (37.1 °C) (!) 90 %      MAP       --                Physical Exam    Nursing note and vitals reviewed.  Constitutional: She appears well-developed and well-nourished. She is not diaphoretic. No distress.   HENT:   Head: Normocephalic and atraumatic.   Right Ear: External ear normal.   Left Ear: External ear normal.   Nose: Nose normal.   Mouth/Throat: Oropharynx is clear and moist.   Eyes: Conjunctivae and EOM are normal. Pupils are equal, round, and reactive to light. No scleral icterus.   Neck: Normal range of motion. Neck supple. No JVD present.   Cardiovascular: Normal rate, regular rhythm, normal heart sounds and intact distal pulses.   Pulmonary/Chest: No respiratory distress. She has no wheezes. She has rhonchi in the right lower field. She has no rales. She exhibits no tenderness.   Abdominal: Soft. Bowel sounds are normal. She exhibits no distension. There is no tenderness. There is no rebound and no guarding.   Musculoskeletal:  Normal range of motion. She exhibits no edema or tenderness.   Lymphadenopathy:     She has no cervical adenopathy.   Neurological: She is alert and oriented to person, place, and time. She has normal strength and normal reflexes. GCS score is 15. GCS eye subscore is 4. GCS verbal subscore is 5. GCS motor subscore is 6.   Skin: Skin is warm. Capillary refill takes less than 2 seconds. No rash noted. No erythema.   Psychiatric: She has a normal mood and affect. Her behavior is normal. Judgment and thought content normal.         ED Course   Procedures  Labs Reviewed   CBC W/ AUTO DIFFERENTIAL - Abnormal; Notable for the following components:       Result Value    WBC 22.46 (*)     MCV 99 (*)     MCH 32.5 (*)     MPV 9.0 (*)     Immature Granulocytes 0.7 (*)     Gran # (ANC) 19.5 (*)     Immature Grans (Abs) 0.16 (*)     Mono # 1.1 (*)     Gran% 86.8 (*)     Lymph% 7.5 (*)     All other components within normal limits   COMPREHENSIVE METABOLIC PANEL - Abnormal; Notable for the following components:    Sodium 131 (*)     Glucose 111 (*)     BUN, Bld 26 (*)     Calcium 8.5 (*)     eGFR if non  55.2 (*)     All other components within normal limits   PROTIME-INR - Abnormal; Notable for the following components:    Prothrombin Time 14.2 (*)     INR 1.3 (*)     All other components within normal limits   B-TYPE NATRIURETIC PEPTIDE - Abnormal; Notable for the following components:     (*)     All other components within normal limits   CULTURE, BLOOD    Narrative:     Aerobic and anaerobic   CULTURE, BLOOD    Narrative:     Aerobic and anaerobic   INFLUENZA A & B BY MOLECULAR   LACTIC ACID, PLASMA   MAGNESIUM   APTT   LIPASE   TROPONIN I     EKG Readings: (Independently Interpreted)   Initial Reading: No STEMI. Rhythm: Normal Sinus Rhythm. Heart Rate: 75. Ectopy: No Ectopy. Conduction: Normal. ST Segments: Normal ST Segments. T Waves: Normal. Axis: Normal. Clinical Impression: Normal Sinus Rhythm      ECG Results          EKG 12-lead (Final result)  Result time 03/25/19 16:25:49    Final result by Interface, Lab In [facility] (03/25/19 16:25:49)                 Narrative:    Test Reason : BELLY PAIN    Vent. Rate : 075 BPM     Atrial Rate : 075 BPM     P-R Int : 142 ms          QRS Dur : 096 ms      QT Int : 382 ms       P-R-T Axes : 075 -73 084 degrees     QTc Int : 426 ms    Normal sinus rhythm with sinus arrhythmia  Possible Left atrial enlargement  Left anterior fascicular block  Nonspecific T wave abnormality  Abnormal ECG  No previous ECGs available  Confirmed by Lucian Santillan MD ([id number]) on 3/25/2019 4:25:38 PM    Referred By: [email or web address]   SELF           Confirmed By:Lucian Santillan MD                            Imaging Results          CT Renal Stone Study ABD Pelvis WO (Final result)  Result time 03/23/19 15:16:22    Final result by Tavo Siu MD (03/23/19 15:16:22)                 Impression:      Rather limited motion degraded examination demonstrating gaseous and fluid distention of the colon suggesting loose stools but no definite obstruction.    Known patent lesions are not well evaluated are grossly unchanged relative to 2017    There is evidence of prior lumbar and right hip surgery      Electronically signed by: Tavo Siu MD  Date:    03/23/2019  Time:    15:16             Narrative:    EXAMINATION:  CT RENAL STONE STUDY ABD PELVIS WO    CLINICAL HISTORY:  Abdominal pain, unspecified;Flank pain, stone disease suspected;    TECHNIQUE:  Low dose axial images, sagittal and coronal reformations were obtained from the lung bases to the pubic symphysis.  Contrast was not administered.    COMPARISON:  August 17, 2017    FINDINGS:  Extensive vascular calcifications are present and there is mild infrarenal abdominal aortic ectasia with a maximal cross-sectional diameter of 1.8 cm.  The images are markedly degraded by motion in the examination is limited.  There is cardiomegaly.  The  patient's known hepatic mass lesions not well seen but are not grossly changed relative the prior exam with the most definite lesion on today's motion degraded examination being in the anterior segment of the right lobe on series number 2, image number 31 measuring perhaps 17 mm maximally.  The adrenal glands are unremarkable.  There is no evidence of hydronephrosis.  There is air and fluid distention of the colon suggesting diarrhea and loose stools.  A few diverticula are seen but I do not see CT evidence of diverticulitis.  There is no evidence of mike obstruction.  There is a mild levoscoliosis.  The appendix is not confidently seen but the region of the appendix is grossly unremarkable.                               X-Ray Chest AP Portable (Final result)  Result time 03/23/19 14:17:36    Final result by Trent Bryant MD (03/23/19 14:17:36)                 Impression:      Patchy right basilar infiltrate.  Mild cardiomegaly.      Electronically signed by: Trent Bryant MD  Date:    03/23/2019  Time:    14:17             Narrative:    EXAMINATION:  XR CHEST AP PORTABLE    CLINICAL HISTORY:  Sepsis;    TECHNIQUE:  Single frontal view of the chest was performed.    COMPARISON:  02/03/2018    FINDINGS:  The heart is mildly enlarged.  There is calcification of the aorta.  There is patchy infiltrate at the right lung base.  No pleural effusion.                              X-Rays:   Independently Interpreted Readings:   Chest X-Ray: Cardiomegaly present. There is an infiltrate in the RLL.     Medical Decision Making:   Differential Diagnosis:   Gastroenteritis, diverticulitis/diverticulosis, acute pancreatitis, acute cholecystitis, acute nephrolithiasis, pneumonia, acute myocardial infarction, congestive heart failure, electrolyte abnormality, sepsis  ED Management:  Right lower lobe pneumonia was treated with levaquin IV in ED, will send out on oral. Not requiring supplemental oxygen, vitals stable, and  good outpatient follow up . Pt and family comfortable returning home with close follow up.                      Clinical Impression:       ICD-10-CM ICD-9-CM   1. Pneumonia of right lower lobe due to infectious organism J18.1 486   2. Nausea R11.0 787.02   3. Leukocytosis, unspecified type D72.829 288.60         Disposition:   Disposition: Discharged  Condition: Stable                        Mireya Butt MD  03/27/19 [id number]

## 2019-03-28 ENCOUNTER — LAB VISIT (OUTPATIENT)
Dept: LAB | Facility: HOSPITAL | Age: 74
End: 2019-03-28
Attending: NURSE PRACTITIONER
Payer: MEDICARE

## 2019-03-28 DIAGNOSIS — E87.1 HYPOSMOLALITY SYNDROME: Primary | ICD-10-CM

## 2019-03-28 LAB
ANION GAP SERPL CALC-SCNC: 9 MMOL/L (ref 8–16)
BACTERIA BLD CULT: NORMAL
BACTERIA BLD CULT: NORMAL
BUN SERPL-MCNC: 19 MG/DL (ref 8–23)
CALCIUM SERPL-MCNC: 8.4 MG/DL (ref 8.7–10.5)
CHLORIDE SERPL-SCNC: 99 MMOL/L (ref 95–110)
CO2 SERPL-SCNC: 29 MMOL/L (ref 23–29)
CREAT SERPL-MCNC: 0.8 MG/DL (ref 0.5–1.4)
EST. GFR  (AFRICAN AMERICAN): >60 ML/MIN/1.73 M^2
EST. GFR  (NON AFRICAN AMERICAN): >60 ML/MIN/1.73 M^2
GLUCOSE SERPL-MCNC: 79 MG/DL (ref 70–110)
POTASSIUM SERPL-SCNC: 3.4 MMOL/L (ref 3.5–5.1)
SODIUM SERPL-SCNC: 137 MMOL/L (ref 136–145)

## 2019-03-28 PROCEDURE — 36415 COLL VENOUS BLD VENIPUNCTURE: CPT

## 2019-03-28 PROCEDURE — 80048 BASIC METABOLIC PNL TOTAL CA: CPT

## 2019-04-02 ENCOUNTER — LAB VISIT (OUTPATIENT)
Dept: LAB | Facility: HOSPITAL | Age: 74
End: 2019-04-02
Attending: NURSE PRACTITIONER
Payer: MEDICARE

## 2019-04-02 DIAGNOSIS — Z87.09 HISTORY OF ASTHMA: ICD-10-CM

## 2019-04-02 DIAGNOSIS — I50.9 HEART FAILURE, UNSPECIFIED: Primary | ICD-10-CM

## 2019-04-02 DIAGNOSIS — J44.89 OBSTRUCTIVE CHRONIC BRONCHITIS WITHOUT EXACERBATION: ICD-10-CM

## 2019-04-02 DIAGNOSIS — I10 ESSENTIAL HYPERTENSION, MALIGNANT: ICD-10-CM

## 2019-04-02 LAB
ANION GAP SERPL CALC-SCNC: 11 MMOL/L (ref 8–16)
BNP SERPL-MCNC: 191 PG/ML (ref 0–99)
BUN SERPL-MCNC: 17 MG/DL (ref 8–23)
CALCIUM SERPL-MCNC: 8 MG/DL (ref 8.7–10.5)
CHLORIDE SERPL-SCNC: 101 MMOL/L (ref 95–110)
CO2 SERPL-SCNC: 24 MMOL/L (ref 23–29)
CREAT SERPL-MCNC: 0.7 MG/DL (ref 0.5–1.4)
EST. GFR  (AFRICAN AMERICAN): >60 ML/MIN/1.73 M^2
EST. GFR  (NON AFRICAN AMERICAN): >60 ML/MIN/1.73 M^2
GLUCOSE SERPL-MCNC: 85 MG/DL (ref 70–110)
POTASSIUM SERPL-SCNC: 3.7 MMOL/L (ref 3.5–5.1)
SODIUM SERPL-SCNC: 136 MMOL/L (ref 136–145)

## 2019-04-02 PROCEDURE — 83880 ASSAY OF NATRIURETIC PEPTIDE: CPT

## 2019-04-02 PROCEDURE — 36415 COLL VENOUS BLD VENIPUNCTURE: CPT

## 2019-04-02 PROCEDURE — 80048 BASIC METABOLIC PNL TOTAL CA: CPT

## 2019-04-09 ENCOUNTER — LAB VISIT (OUTPATIENT)
Dept: LAB | Facility: HOSPITAL | Age: 74
End: 2019-04-09
Attending: NURSE PRACTITIONER
Payer: MEDICARE

## 2019-04-09 DIAGNOSIS — I50.9 HEART FAILURE, UNSPECIFIED: Primary | ICD-10-CM

## 2019-04-09 DIAGNOSIS — J44.89 OBSTRUCTIVE CHRONIC BRONCHITIS WITHOUT EXACERBATION: ICD-10-CM

## 2019-04-09 LAB
BASOPHILS # BLD AUTO: 0.04 K/UL (ref 0–0.2)
BASOPHILS NFR BLD: 0.8 % (ref 0–1.9)
DIFFERENTIAL METHOD: ABNORMAL
EOSINOPHIL # BLD AUTO: 0.2 K/UL (ref 0–0.5)
EOSINOPHIL NFR BLD: 3 % (ref 0–8)
ERYTHROCYTE [DISTWIDTH] IN BLOOD BY AUTOMATED COUNT: 13.1 % (ref 11.5–14.5)
HCT VFR BLD AUTO: 38.4 % (ref 37–48.5)
HGB BLD-MCNC: 12.6 G/DL (ref 12–16)
IMM GRANULOCYTES # BLD AUTO: 0.02 K/UL (ref 0–0.04)
IMM GRANULOCYTES NFR BLD AUTO: 0.4 % (ref 0–0.5)
LYMPHOCYTES # BLD AUTO: 2 K/UL (ref 1–4.8)
LYMPHOCYTES NFR BLD: 39.9 % (ref 18–48)
MCH RBC QN AUTO: 31.8 PG (ref 27–31)
MCHC RBC AUTO-ENTMCNC: 32.8 G/DL (ref 32–36)
MCV RBC AUTO: 97 FL (ref 82–98)
MONOCYTES # BLD AUTO: 0.5 K/UL (ref 0.3–1)
MONOCYTES NFR BLD: 9.1 % (ref 4–15)
NEUTROPHILS # BLD AUTO: 2.4 K/UL (ref 1.8–7.7)
NEUTROPHILS NFR BLD: 46.8 % (ref 38–73)
NRBC BLD-RTO: 0 /100 WBC
PLATELET # BLD AUTO: 324 K/UL (ref 150–350)
PMV BLD AUTO: 9.1 FL (ref 9.2–12.9)
RBC # BLD AUTO: 3.96 M/UL (ref 4–5.4)
WBC # BLD AUTO: 5.04 K/UL (ref 3.9–12.7)

## 2019-04-09 PROCEDURE — 36415 COLL VENOUS BLD VENIPUNCTURE: CPT

## 2019-04-09 PROCEDURE — 85025 COMPLETE CBC W/AUTO DIFF WBC: CPT

## 2019-04-11 ENCOUNTER — LAB VISIT (OUTPATIENT)
Dept: LAB | Facility: HOSPITAL | Age: 74
End: 2019-04-11
Attending: INTERNAL MEDICINE
Payer: MEDICARE

## 2019-04-11 DIAGNOSIS — J44.89 OBSTRUCTIVE CHRONIC BRONCHITIS WITHOUT EXACERBATION: ICD-10-CM

## 2019-04-11 DIAGNOSIS — J43.9 EMPHYSEMATOUS BLEB: Primary | ICD-10-CM

## 2019-04-11 DIAGNOSIS — I50.9 HEART FAILURE, UNSPECIFIED: ICD-10-CM

## 2019-04-11 DIAGNOSIS — Z87.09 HISTORY OF ASTHMA: ICD-10-CM

## 2019-04-11 LAB
ANION GAP SERPL CALC-SCNC: 8 MMOL/L (ref 8–16)
BASOPHILS # BLD AUTO: 0.04 K/UL (ref 0–0.2)
BASOPHILS NFR BLD: 0.8 % (ref 0–1.9)
BNP SERPL-MCNC: 288 PG/ML (ref 0–99)
BUN SERPL-MCNC: 13 MG/DL (ref 8–23)
CALCIUM SERPL-MCNC: 8 MG/DL (ref 8.7–10.5)
CHLORIDE SERPL-SCNC: 106 MMOL/L (ref 95–110)
CO2 SERPL-SCNC: 23 MMOL/L (ref 23–29)
CREAT SERPL-MCNC: 0.6 MG/DL (ref 0.5–1.4)
DIFFERENTIAL METHOD: ABNORMAL
EOSINOPHIL # BLD AUTO: 0.1 K/UL (ref 0–0.5)
EOSINOPHIL NFR BLD: 2.6 % (ref 0–8)
ERYTHROCYTE [DISTWIDTH] IN BLOOD BY AUTOMATED COUNT: 13.2 % (ref 11.5–14.5)
EST. GFR  (AFRICAN AMERICAN): >60 ML/MIN/1.73 M^2
EST. GFR  (NON AFRICAN AMERICAN): >60 ML/MIN/1.73 M^2
GLUCOSE SERPL-MCNC: 83 MG/DL (ref 70–110)
HCT VFR BLD AUTO: 38.1 % (ref 37–48.5)
HGB BLD-MCNC: 12.5 G/DL (ref 12–16)
IMM GRANULOCYTES # BLD AUTO: 0.01 K/UL (ref 0–0.04)
IMM GRANULOCYTES NFR BLD AUTO: 0.2 % (ref 0–0.5)
LYMPHOCYTES # BLD AUTO: 2.2 K/UL (ref 1–4.8)
LYMPHOCYTES NFR BLD: 43 % (ref 18–48)
MCH RBC QN AUTO: 32.2 PG (ref 27–31)
MCHC RBC AUTO-ENTMCNC: 32.8 G/DL (ref 32–36)
MCV RBC AUTO: 98 FL (ref 82–98)
MONOCYTES # BLD AUTO: 0.4 K/UL (ref 0.3–1)
MONOCYTES NFR BLD: 7.9 % (ref 4–15)
NEUTROPHILS # BLD AUTO: 2.3 K/UL (ref 1.8–7.7)
NEUTROPHILS NFR BLD: 45.5 % (ref 38–73)
NRBC BLD-RTO: 0 /100 WBC
PLATELET # BLD AUTO: 312 K/UL (ref 150–350)
PMV BLD AUTO: 9.5 FL (ref 9.2–12.9)
POTASSIUM SERPL-SCNC: 3.6 MMOL/L (ref 3.5–5.1)
RBC # BLD AUTO: 3.88 M/UL (ref 4–5.4)
SODIUM SERPL-SCNC: 137 MMOL/L (ref 136–145)
WBC # BLD AUTO: 5.09 K/UL (ref 3.9–12.7)

## 2019-04-11 PROCEDURE — 85025 COMPLETE CBC W/AUTO DIFF WBC: CPT

## 2019-04-11 PROCEDURE — 36415 COLL VENOUS BLD VENIPUNCTURE: CPT

## 2019-04-11 PROCEDURE — 80048 BASIC METABOLIC PNL TOTAL CA: CPT

## 2019-04-11 PROCEDURE — 83880 ASSAY OF NATRIURETIC PEPTIDE: CPT

## 2019-05-31 ENCOUNTER — LAB VISIT (OUTPATIENT)
Dept: LAB | Facility: HOSPITAL | Age: 74
End: 2019-05-31
Attending: INTERNAL MEDICINE
Payer: MEDICARE

## 2019-05-31 DIAGNOSIS — I50.9 HEART FAILURE, UNSPECIFIED: Primary | ICD-10-CM

## 2019-05-31 DIAGNOSIS — G40.89 SOMATOSENSORY ATTACKS: ICD-10-CM

## 2019-05-31 LAB
ALBUMIN SERPL BCP-MCNC: 4.2 G/DL (ref 3.5–5.2)
ALP SERPL-CCNC: 67 U/L (ref 55–135)
ALT SERPL W/O P-5'-P-CCNC: 23 U/L (ref 10–44)
ANION GAP SERPL CALC-SCNC: 14 MMOL/L (ref 8–16)
AST SERPL-CCNC: 29 U/L (ref 10–40)
BILIRUB DIRECT SERPL-MCNC: 0.3 MG/DL (ref 0.1–0.3)
BILIRUB SERPL-MCNC: 0.8 MG/DL (ref 0.1–1)
BUN SERPL-MCNC: 14 MG/DL (ref 8–23)
CALCIUM SERPL-MCNC: 9.3 MG/DL (ref 8.7–10.5)
CHLORIDE SERPL-SCNC: 105 MMOL/L (ref 95–110)
CHOLEST SERPL-MCNC: 173 MG/DL (ref 120–199)
CHOLEST/HDLC SERPL: 1.8 {RATIO} (ref 2–5)
CO2 SERPL-SCNC: 20 MMOL/L (ref 23–29)
CREAT SERPL-MCNC: 0.8 MG/DL (ref 0.5–1.4)
EST. GFR  (AFRICAN AMERICAN): >60 ML/MIN/1.73 M^2
EST. GFR  (NON AFRICAN AMERICAN): >60 ML/MIN/1.73 M^2
GLUCOSE SERPL-MCNC: 135 MG/DL (ref 70–110)
HDLC SERPL-MCNC: 96 MG/DL (ref 40–75)
HDLC SERPL: 55.5 % (ref 20–50)
LDLC SERPL CALC-MCNC: 56.6 MG/DL (ref 63–159)
NONHDLC SERPL-MCNC: 77 MG/DL
PHENYTOIN SERPL-MCNC: 15.3 UG/ML (ref 10–20)
POTASSIUM SERPL-SCNC: 3.8 MMOL/L (ref 3.5–5.1)
PROT SERPL-MCNC: 7.2 G/DL (ref 6–8.4)
SODIUM SERPL-SCNC: 139 MMOL/L (ref 136–145)
TRIGL SERPL-MCNC: 102 MG/DL (ref 30–150)

## 2019-05-31 PROCEDURE — 80185 ASSAY OF PHENYTOIN TOTAL: CPT

## 2019-05-31 PROCEDURE — 80076 HEPATIC FUNCTION PANEL: CPT

## 2019-05-31 PROCEDURE — 36415 COLL VENOUS BLD VENIPUNCTURE: CPT

## 2019-05-31 PROCEDURE — 80048 BASIC METABOLIC PNL TOTAL CA: CPT

## 2019-05-31 PROCEDURE — 80061 LIPID PANEL: CPT

## 2019-06-03 ENCOUNTER — HOSPITAL ENCOUNTER (EMERGENCY)
Facility: HOSPITAL | Age: 74
Discharge: HOME | End: 2019-06-03
Attending: FAMILY MEDICINE
Payer: MEDICARE

## 2019-06-03 VITALS
HEIGHT: 63 IN | HEART RATE: 65 BPM | WEIGHT: 102 LBS | RESPIRATION RATE: 18 BRPM | BODY MASS INDEX: 18.07 KG/M2 | DIASTOLIC BLOOD PRESSURE: 67 MMHG | SYSTOLIC BLOOD PRESSURE: 124 MMHG | OXYGEN SATURATION: 86 % | TEMPERATURE: 98 F

## 2019-06-03 DIAGNOSIS — S01.311A LACERATION OF RIGHT EAR LOBE, INITIAL ENCOUNTER: Primary | ICD-10-CM

## 2019-06-03 PROCEDURE — 12011 RPR F/E/E/N/L/M 2.5 CM/<: CPT

## 2019-06-03 PROCEDURE — 25000003 PHARM REV CODE 250: Performed by: FAMILY MEDICINE

## 2019-06-03 PROCEDURE — 99282 EMERGENCY DEPT VISIT SF MDM: CPT | Mod: 25

## 2019-06-03 RX ORDER — LIDOCAINE HYDROCHLORIDE 10 MG/ML
1 INJECTION INFILTRATION; PERINEURAL ONCE
Status: COMPLETED | OUTPATIENT
Start: 2019-06-03 | End: 2019-06-03

## 2019-06-03 RX ADMIN — LIDOCAINE HYDROCHLORIDE 1 ML: 10 INJECTION, SOLUTION INFILTRATION; PERINEURAL at 08:06

## 2019-06-04 NOTE — ED NOTES
RN calls patient's daughter to facilitate a ride back to the nursing home. RN discusses patient care with daughter. She states she is unable to come get her because she lives in [place] but she will make a few phone calls and call back.

## 2019-06-04 NOTE — ED NOTES
Patient's daughter calls back, states her Uncle Colt is on the way to take the patient back to the nursing home. Patient's daughter requests that she be called back upon his arrival.

## 2019-06-04 NOTE — ED NOTES
Bed: Exam 06  Expected date: 6/3/19  Expected time: 8:21 PM  Means of arrival: Ambulance Service  Comments:  Female Laceration

## 2019-06-04 NOTE — DISCHARGE INSTRUCTIONS
Keep wound clean and dry, wash twice daily with soap and water, pat dry and cover with dressing until completely healed.  Have sutures out in 10 days.  Return to the ER sooner than 10 days if signs of infection develop such as redness, heat, drainage, or increased pain. May return to the ER for suture removal or follow up with primary care provider.

## 2019-06-04 NOTE — ED NOTES
Patient's brother arrives to take patient home. [facility] me[facility] called, patient's care discussed with JOSE Skaggs. Patient's daughter called to inform her that the patient ED.

## 2019-06-04 NOTE — ED PROVIDER NOTES
Encounter Date: 6/3/2019       History     Chief Complaint   Patient presents with    EAR LACERATION     PULLED EARRINGS OUT OF EARS, REPORTS CAUGHT IN SWEATER     Patient arrived by EMS from a local nursing home, states she accidentally pulled her earring out of her ear lobe causing a laceration just prior to arrival.        Review of patient's allergies indicates:   Allergen Reactions    Coumadin [warfarin]     Tetanus vaccines and toxoid      Past Medical History:   Diagnosis Date    Age related osteoporosis     Anxiety disorder due to known physiological condition     Central retinal vein occlusion, unspecified eye, with retinal neovascularization     Chronic kidney disease     Diarrhea, unspecified     Emphysema, unspecified     Epilepsy     GERD (gastroesophageal reflux disease)     Hyperlipidemia     Low back pain     Moderate protein-calorie malnutrition     Other forms of dyspnea     Other seizures     Personal history of (healed) traumatic fracture     Personal history of other diseases of the respiratory system     Personal history of urinary (tract) infections     Postgastric surgery syndromes     Postsurgical malabsorption, not elsewhere classified     Retinal ischemia     Shortness of breath     Tachycardia, unspecified     Unspecified dementia without behavioral disturbance      Past Surgical History:   Procedure Laterality Date    CORONARY STENT PLACEMENT       No family history on file.  Social History     Tobacco Use    Smoking status: Former Smoker     Last attempt to quit: 6/3/2015     Years since quittin.0   Substance Use Topics    Alcohol use: No     Frequency: Never    Drug use: Not on file     Review of Systems   Constitutional: Negative.    HENT: Negative.    Eyes: Negative.    Respiratory: Negative.    Cardiovascular: Negative.    Gastrointestinal: Negative.    Endocrine: Negative.    Genitourinary: Negative.    Musculoskeletal: Negative.    Skin: Positive  for wound.   Allergic/Immunologic: Negative.    Neurological: Negative.    Hematological: Negative.    Psychiatric/Behavioral: Negative.        Physical Exam     Initial Vitals [06/03/19 2027]   BP Pulse Resp Temp SpO2   124/67 65 18 97.9 °F (36.6 °C) (!) 86 %      MAP       --         Physical Exam    Nursing note and vitals reviewed.  Constitutional: She appears well-developed and well-nourished. She is not diaphoretic. No distress.   HENT:   Head: Normocephalic and atraumatic.   Eyes: Conjunctivae and EOM are normal. Pupils are equal, round, and reactive to light.   Neck: Normal range of motion. Neck supple.   Pulmonary/Chest: No respiratory distress.   Musculoskeletal: Normal range of motion. She exhibits no edema.   Neurological: She is alert and oriented to person, place, and time. She has normal strength.   Skin: Skin is warm and dry. Capillary refill takes less than 2 seconds. No rash noted. No erythema.   Right earlobe with laceration through piercing. Bleeding controlled.    Psychiatric: She has a normal mood and affect. Her behavior is normal. Judgment and thought content normal.         ED Course   Lac Repair  Date/Time: 6/4/2019 6:01 AM  Performed by: Ryann Staley MD  Authorized by: Ryann Staley MD   Consent Done: Yes  Consent given by: patient  Patient understanding: patient states understanding of the procedure being performed  Patient identity confirmed: name and verbally with patient  Body area: head/neck  Location details: right ear  Laceration length: 0.5 cm  Foreign bodies: no foreign bodies  Vascular damage: no  Anesthesia: local infiltration    Anesthesia:  Local Anesthetic: lidocaine 1% without epinephrine  Anesthetic total: 0.5 mL  Patient sedated: no  Amount of cleaning: standard  Debridement: none  Degree of undermining: none  Skin closure: 3-0 nylon  Number of sutures: 2  Approximation: close  Approximation difficulty: simple  Patient tolerance: Patient tolerated the procedure  well with no immediate complications        Labs Reviewed - No data to display       Imaging Results    None                               Clinical Impression:       ICD-10-CM ICD-9-CM   1. Laceration of right ear lobe, initial encounter S01.311A 872.01                                Ryann Staley MD  06/04/19 0602

## 2019-08-27 ENCOUNTER — LAB VISIT (OUTPATIENT)
Dept: LAB | Facility: HOSPITAL | Age: 74
End: 2019-08-27
Attending: INTERNAL MEDICINE
Payer: MEDICARE

## 2019-08-27 DIAGNOSIS — E78.5 HYPERLIPEMIA: Primary | ICD-10-CM

## 2019-08-27 DIAGNOSIS — N18.30 CHRONIC KIDNEY DISEASE, STAGE III (MODERATE): ICD-10-CM

## 2019-08-27 DIAGNOSIS — I50.9 HEART FAILURE, UNSPECIFIED: ICD-10-CM

## 2019-08-27 LAB
ALBUMIN SERPL BCP-MCNC: 3.9 G/DL (ref 3.5–5.2)
ALP SERPL-CCNC: 58 U/L (ref 55–135)
ALT SERPL W/O P-5'-P-CCNC: 23 U/L (ref 10–44)
ANION GAP SERPL CALC-SCNC: 17 MMOL/L (ref 8–16)
AST SERPL-CCNC: 22 U/L (ref 10–40)
BILIRUB DIRECT SERPL-MCNC: 0.1 MG/DL (ref 0.1–0.3)
BILIRUB SERPL-MCNC: 0.7 MG/DL (ref 0.1–1)
BUN SERPL-MCNC: 13 MG/DL (ref 8–23)
CALCIUM SERPL-MCNC: 9 MG/DL (ref 8.7–10.5)
CHLORIDE SERPL-SCNC: 105 MMOL/L (ref 95–110)
CHOLEST SERPL-MCNC: 154 MG/DL (ref 120–199)
CHOLEST/HDLC SERPL: 1.7 {RATIO} (ref 2–5)
CO2 SERPL-SCNC: 23 MMOL/L (ref 23–29)
CREAT SERPL-MCNC: 0.7 MG/DL (ref 0.5–1.4)
ERYTHROCYTE [DISTWIDTH] IN BLOOD BY AUTOMATED COUNT: 13.6 % (ref 11.5–14.5)
EST. GFR  (AFRICAN AMERICAN): >60 ML/MIN/1.73 M^2
EST. GFR  (NON AFRICAN AMERICAN): >60 ML/MIN/1.73 M^2
GLUCOSE SERPL-MCNC: 81 MG/DL (ref 70–110)
HCT VFR BLD AUTO: 38.8 % (ref 37–48.5)
HDLC SERPL-MCNC: 93 MG/DL (ref 40–75)
HDLC SERPL: 60.4 % (ref 20–50)
HGB BLD-MCNC: 12.9 G/DL (ref 12–16)
LDLC SERPL CALC-MCNC: 47 MG/DL (ref 63–159)
MCH RBC QN AUTO: 32.9 PG (ref 27–31)
MCHC RBC AUTO-ENTMCNC: 33.2 G/DL (ref 32–36)
MCV RBC AUTO: 99 FL (ref 82–98)
NONHDLC SERPL-MCNC: 61 MG/DL
PHENYTOIN SERPL-MCNC: 4.8 UG/ML (ref 10–20)
PLATELET # BLD AUTO: 258 K/UL (ref 150–350)
PMV BLD AUTO: 9.6 FL (ref 9.2–12.9)
POTASSIUM SERPL-SCNC: 3.5 MMOL/L (ref 3.5–5.1)
PROT SERPL-MCNC: 6.4 G/DL (ref 6–8.4)
RBC # BLD AUTO: 3.92 M/UL (ref 4–5.4)
SODIUM SERPL-SCNC: 145 MMOL/L (ref 136–145)
TRIGL SERPL-MCNC: 70 MG/DL (ref 30–150)
WBC # BLD AUTO: 5.96 K/UL (ref 3.9–12.7)

## 2019-08-27 PROCEDURE — 36415 COLL VENOUS BLD VENIPUNCTURE: CPT

## 2019-08-27 PROCEDURE — 85027 COMPLETE CBC AUTOMATED: CPT

## 2019-08-27 PROCEDURE — 82248 BILIRUBIN DIRECT: CPT

## 2019-08-27 PROCEDURE — 80061 LIPID PANEL: CPT

## 2019-08-27 PROCEDURE — 80053 COMPREHEN METABOLIC PANEL: CPT

## 2019-08-27 PROCEDURE — 85025 COMPLETE CBC W/AUTO DIFF WBC: CPT

## 2019-08-27 PROCEDURE — 80185 ASSAY OF PHENYTOIN TOTAL: CPT

## 2019-08-28 LAB
BASOPHILS # BLD AUTO: 0.05 K/UL (ref 0–0.2)
BASOPHILS NFR BLD: 0.8 % (ref 0–1.9)
DIFFERENTIAL METHOD: ABNORMAL
EOSINOPHIL # BLD AUTO: 0.1 K/UL (ref 0–0.5)
EOSINOPHIL NFR BLD: 1.1 % (ref 0–8)
ERYTHROCYTE [DISTWIDTH] IN BLOOD BY AUTOMATED COUNT: 13.6 % (ref 11.5–14.5)
HCT VFR BLD AUTO: 38.8 % (ref 37–48.5)
HGB BLD-MCNC: 12.9 G/DL (ref 12–16)
IMM GRANULOCYTES # BLD AUTO: 0.01 K/UL (ref 0–0.04)
IMM GRANULOCYTES NFR BLD AUTO: 0.2 % (ref 0–0.5)
LYMPHOCYTES # BLD AUTO: 1.9 K/UL (ref 1–4.8)
LYMPHOCYTES NFR BLD: 30.2 % (ref 18–48)
MCH RBC QN AUTO: 32.9 PG (ref 27–31)
MCHC RBC AUTO-ENTMCNC: 33.2 G/DL (ref 32–36)
MCV RBC AUTO: 99 FL (ref 82–98)
MONOCYTES # BLD AUTO: 0.5 K/UL (ref 0.3–1)
MONOCYTES NFR BLD: 8.8 % (ref 4–15)
NEUTROPHILS # BLD AUTO: 3.6 K/UL (ref 1.8–7.7)
NEUTROPHILS NFR BLD: 58.9 % (ref 38–73)
NRBC BLD-RTO: 0 /100 WBC
PLATELET # BLD AUTO: 258 K/UL (ref 150–350)
PMV BLD AUTO: 9.6 FL (ref 9.2–12.9)
RBC # BLD AUTO: 3.92 M/UL (ref 4–5.4)
WBC # BLD AUTO: 5.96 K/UL (ref 3.9–12.7)

## 2019-09-10 ENCOUNTER — LAB VISIT (OUTPATIENT)
Dept: LAB | Facility: HOSPITAL | Age: 74
End: 2019-09-10
Attending: INTERNAL MEDICINE
Payer: MEDICARE

## 2019-09-10 DIAGNOSIS — G40.901 STATUS EPILEPTICUS: Primary | ICD-10-CM

## 2019-09-10 LAB — PHENYTOIN SERPL-MCNC: 20.2 UG/ML (ref 10–20)

## 2019-09-10 PROCEDURE — 80185 ASSAY OF PHENYTOIN TOTAL: CPT

## 2019-09-10 PROCEDURE — 36415 COLL VENOUS BLD VENIPUNCTURE: CPT

## 2019-09-24 ENCOUNTER — LAB VISIT (OUTPATIENT)
Dept: LAB | Facility: HOSPITAL | Age: 74
End: 2019-09-24
Attending: INTERNAL MEDICINE
Payer: MEDICARE

## 2019-09-24 DIAGNOSIS — G40.801 OTHER EPILEPSY WITH STATUS EPILEPTICUS, NOT INTRACTABLE: Primary | ICD-10-CM

## 2019-09-24 LAB — PHENYTOIN SERPL-MCNC: 12.8 UG/ML (ref 10–20)

## 2019-09-24 PROCEDURE — 36415 COLL VENOUS BLD VENIPUNCTURE: CPT

## 2019-09-24 PROCEDURE — 80185 ASSAY OF PHENYTOIN TOTAL: CPT

## 2019-11-01 ENCOUNTER — APPOINTMENT (OUTPATIENT)
Dept: LAB | Facility: HOSPITAL | Age: 74
End: 2019-11-01
Attending: NURSE PRACTITIONER
Payer: MEDICARE

## 2019-11-01 DIAGNOSIS — N39.0 URINARY TRACT INFECTION, SITE NOT SPECIFIED: Primary | ICD-10-CM

## 2019-11-01 LAB
BILIRUB UR QL STRIP: NEGATIVE
CLARITY UR: CLEAR
COLOR UR: YELLOW
GLUCOSE UR QL STRIP: NEGATIVE
HGB UR QL STRIP: NEGATIVE
KETONES UR QL STRIP: NEGATIVE
LEUKOCYTE ESTERASE UR QL STRIP: NEGATIVE
NITRITE UR QL STRIP: NEGATIVE
PH UR STRIP: >8 [PH] (ref 5–8)
PROT UR QL STRIP: NEGATIVE
SP GR UR STRIP: 1.01 (ref 1–1.03)
URN SPEC COLLECT METH UR: ABNORMAL
UROBILINOGEN UR STRIP-ACNC: NEGATIVE EU/DL

## 2019-11-01 PROCEDURE — 81003 URINALYSIS AUTO W/O SCOPE: CPT

## 2019-11-01 PROCEDURE — 87086 URINE CULTURE/COLONY COUNT: CPT

## 2019-11-03 ENCOUNTER — LAB VISIT (OUTPATIENT)
Dept: LAB | Facility: HOSPITAL | Age: 74
End: 2019-11-03
Attending: NURSE PRACTITIONER
Payer: MEDICARE

## 2019-11-03 DIAGNOSIS — R19.7 DIARRHEA OF PRESUMED INFECTIOUS ORIGIN: Primary | ICD-10-CM

## 2019-11-03 LAB
BACTERIA UR CULT: NORMAL
BACTERIA UR CULT: NORMAL
C DIFF GDH STL QL: NEGATIVE
C DIFF TOX A+B STL QL IA: NEGATIVE
OB PNL STL: NEGATIVE

## 2019-11-03 PROCEDURE — 87324 CLOSTRIDIUM AG IA: CPT

## 2019-11-03 PROCEDURE — 87427 SHIGA-LIKE TOXIN AG IA: CPT

## 2019-11-03 PROCEDURE — 87045 FECES CULTURE AEROBIC BACT: CPT

## 2019-11-03 PROCEDURE — 82270 OCCULT BLOOD FECES: CPT

## 2019-11-03 PROCEDURE — 87046 STOOL CULTR AEROBIC BACT EA: CPT | Mod: 59

## 2019-11-05 ENCOUNTER — LAB VISIT (OUTPATIENT)
Dept: LAB | Facility: HOSPITAL | Age: 74
End: 2019-11-05
Attending: NURSE PRACTITIONER
Payer: MEDICARE

## 2019-11-05 DIAGNOSIS — I42.9 FAMILIAL CARDIOMYOPATHY: ICD-10-CM

## 2019-11-05 DIAGNOSIS — I50.9 HEART FAILURE, UNSPECIFIED: Primary | ICD-10-CM

## 2019-11-05 LAB
AMYLASE SERPL-CCNC: 42 U/L (ref 20–110)
ANION GAP SERPL CALC-SCNC: 8 MMOL/L (ref 8–16)
BASOPHILS # BLD AUTO: 0.05 K/UL (ref 0–0.2)
BASOPHILS NFR BLD: 0.7 % (ref 0–1.9)
BUN SERPL-MCNC: 18 MG/DL (ref 8–23)
CALCIUM SERPL-MCNC: 8.7 MG/DL (ref 8.7–10.5)
CHLORIDE SERPL-SCNC: 106 MMOL/L (ref 95–110)
CO2 SERPL-SCNC: 26 MMOL/L (ref 23–29)
CREAT SERPL-MCNC: 0.9 MG/DL (ref 0.5–1.4)
DIFFERENTIAL METHOD: ABNORMAL
E COLI SXT1 STL QL IA: NEGATIVE
E COLI SXT2 STL QL IA: NEGATIVE
EOSINOPHIL # BLD AUTO: 0.1 K/UL (ref 0–0.5)
EOSINOPHIL NFR BLD: 1.8 % (ref 0–8)
ERYTHROCYTE [DISTWIDTH] IN BLOOD BY AUTOMATED COUNT: 13.1 % (ref 11.5–14.5)
EST. GFR  (AFRICAN AMERICAN): >60 ML/MIN/1.73 M^2
EST. GFR  (NON AFRICAN AMERICAN): >60 ML/MIN/1.73 M^2
GLUCOSE SERPL-MCNC: 82 MG/DL (ref 70–110)
HCT VFR BLD AUTO: 40.8 % (ref 37–48.5)
HGB BLD-MCNC: 13.1 G/DL (ref 12–16)
IMM GRANULOCYTES # BLD AUTO: 0.01 K/UL (ref 0–0.04)
IMM GRANULOCYTES NFR BLD AUTO: 0.1 % (ref 0–0.5)
LIPASE SERPL-CCNC: 26 U/L (ref 4–60)
LYMPHOCYTES # BLD AUTO: 2.6 K/UL (ref 1–4.8)
LYMPHOCYTES NFR BLD: 38.3 % (ref 18–48)
MCH RBC QN AUTO: 32.3 PG (ref 27–31)
MCHC RBC AUTO-ENTMCNC: 32.1 G/DL (ref 32–36)
MCV RBC AUTO: 101 FL (ref 82–98)
MONOCYTES # BLD AUTO: 0.4 K/UL (ref 0.3–1)
MONOCYTES NFR BLD: 6.1 % (ref 4–15)
NEUTROPHILS # BLD AUTO: 3.6 K/UL (ref 1.8–7.7)
NEUTROPHILS NFR BLD: 53 % (ref 38–73)
NRBC BLD-RTO: 0 /100 WBC
O+P STL MICRO: NORMAL
PLATELET # BLD AUTO: 232 K/UL (ref 150–350)
PMV BLD AUTO: 9.7 FL (ref 9.2–12.9)
POTASSIUM SERPL-SCNC: 4.5 MMOL/L (ref 3.5–5.1)
RBC # BLD AUTO: 4.06 M/UL (ref 4–5.4)
SODIUM SERPL-SCNC: 140 MMOL/L (ref 136–145)
WBC # BLD AUTO: 6.73 K/UL (ref 3.9–12.7)

## 2019-11-05 PROCEDURE — 36415 COLL VENOUS BLD VENIPUNCTURE: CPT

## 2019-11-05 PROCEDURE — 80048 BASIC METABOLIC PNL TOTAL CA: CPT

## 2019-11-05 PROCEDURE — 83690 ASSAY OF LIPASE: CPT

## 2019-11-05 PROCEDURE — 82150 ASSAY OF AMYLASE: CPT

## 2019-11-05 PROCEDURE — 85025 COMPLETE CBC W/AUTO DIFF WBC: CPT

## 2019-11-07 LAB — BACTERIA STL CULT: NORMAL

## 2019-11-21 ENCOUNTER — LAB VISIT (OUTPATIENT)
Dept: LAB | Facility: HOSPITAL | Age: 74
End: 2019-11-21
Attending: INTERNAL MEDICINE
Payer: MEDICARE

## 2019-11-21 DIAGNOSIS — E78.5 HYPERLIPEMIA: Primary | ICD-10-CM

## 2019-11-21 DIAGNOSIS — I10 ESSENTIAL HYPERTENSION, MALIGNANT: ICD-10-CM

## 2019-11-21 LAB
ALBUMIN SERPL BCP-MCNC: 3.5 G/DL (ref 3.5–5.2)
ALP SERPL-CCNC: 52 U/L (ref 55–135)
ALT SERPL W/O P-5'-P-CCNC: 16 U/L (ref 10–44)
ANION GAP SERPL CALC-SCNC: 9 MMOL/L (ref 8–16)
AST SERPL-CCNC: 16 U/L (ref 10–40)
BILIRUB DIRECT SERPL-MCNC: 0.1 MG/DL (ref 0.1–0.3)
BILIRUB SERPL-MCNC: 0.6 MG/DL (ref 0.1–1)
BUN SERPL-MCNC: 16 MG/DL (ref 8–23)
CALCIUM SERPL-MCNC: 8.2 MG/DL (ref 8.7–10.5)
CHLORIDE SERPL-SCNC: 105 MMOL/L (ref 95–110)
CHOLEST SERPL-MCNC: 135 MG/DL (ref 120–199)
CHOLEST/HDLC SERPL: 1.7 {RATIO} (ref 2–5)
CO2 SERPL-SCNC: 24 MMOL/L (ref 23–29)
CREAT SERPL-MCNC: 0.8 MG/DL (ref 0.5–1.4)
EST. GFR  (AFRICAN AMERICAN): >60 ML/MIN/1.73 M^2
EST. GFR  (NON AFRICAN AMERICAN): >60 ML/MIN/1.73 M^2
GLUCOSE SERPL-MCNC: 82 MG/DL (ref 70–110)
HDLC SERPL-MCNC: 81 MG/DL (ref 40–75)
HDLC SERPL: 60 % (ref 20–50)
LDLC SERPL CALC-MCNC: 40.8 MG/DL (ref 63–159)
NONHDLC SERPL-MCNC: 54 MG/DL
PHENYTOIN SERPL-MCNC: 10.8 UG/ML (ref 10–20)
POTASSIUM SERPL-SCNC: 4.2 MMOL/L (ref 3.5–5.1)
PROT SERPL-MCNC: 5.5 G/DL (ref 6–8.4)
SODIUM SERPL-SCNC: 138 MMOL/L (ref 136–145)
TRIGL SERPL-MCNC: 66 MG/DL (ref 30–150)

## 2019-11-21 PROCEDURE — 80048 BASIC METABOLIC PNL TOTAL CA: CPT

## 2019-11-21 PROCEDURE — 80061 LIPID PANEL: CPT

## 2019-11-21 PROCEDURE — 80185 ASSAY OF PHENYTOIN TOTAL: CPT

## 2019-11-21 PROCEDURE — 36415 COLL VENOUS BLD VENIPUNCTURE: CPT

## 2019-11-21 PROCEDURE — 80076 HEPATIC FUNCTION PANEL: CPT

## 2020-01-15 ENCOUNTER — INITIAL CONSULT (OUTPATIENT)
Dept: HEMATOLOGY/ONCOLOGY | Facility: CLINIC | Age: 75
End: 2020-01-15
Payer: MEDICARE

## 2020-01-15 ENCOUNTER — TELEPHONE (OUTPATIENT)
Dept: HEMATOLOGY/ONCOLOGY | Facility: CLINIC | Age: 75
End: 2020-01-15

## 2020-01-15 VITALS
TEMPERATURE: 98 F | OXYGEN SATURATION: 95 % | WEIGHT: 99 LBS | HEART RATE: 64 BPM | SYSTOLIC BLOOD PRESSURE: 137 MMHG | HEIGHT: 63 IN | DIASTOLIC BLOOD PRESSURE: 63 MMHG | RESPIRATION RATE: 18 BRPM | BODY MASS INDEX: 17.54 KG/M2

## 2020-01-15 DIAGNOSIS — R91.8 OTHER NONSPECIFIC ABNORMAL FINDING OF LUNG FIELD: ICD-10-CM

## 2020-01-15 DIAGNOSIS — C34.91 PRIMARY LUNG ADENOCARCINOMA, RIGHT: Primary | ICD-10-CM

## 2020-01-15 PROCEDURE — 99205 OFFICE O/P NEW HI 60 MIN: CPT | Mod: S$PBB,,, | Performed by: INTERNAL MEDICINE

## 2020-01-15 PROCEDURE — 99999 PR PBB SHADOW E&M-EST. PATIENT-LVL V: CPT | Mod: PBBFAC,,, | Performed by: INTERNAL MEDICINE

## 2020-01-15 PROCEDURE — G0444 DEPRESSION SCREEN ANNUAL: HCPCS | Mod: PBBFAC | Performed by: INTERNAL MEDICINE

## 2020-01-15 PROCEDURE — 1125F AMNT PAIN NOTED PAIN PRSNT: CPT | Mod: ,,, | Performed by: INTERNAL MEDICINE

## 2020-01-15 PROCEDURE — 1125F PR PAIN SEVERITY QUANTIFIED, PAIN PRESENT: ICD-10-PCS | Mod: ,,, | Performed by: INTERNAL MEDICINE

## 2020-01-15 PROCEDURE — 1159F PR MEDICATION LIST DOCUMENTED IN MEDICAL RECORD: ICD-10-PCS | Mod: ,,, | Performed by: INTERNAL MEDICINE

## 2020-01-15 PROCEDURE — 99215 OFFICE O/P EST HI 40 MIN: CPT | Mod: PBBFAC,25 | Performed by: INTERNAL MEDICINE

## 2020-01-15 PROCEDURE — 99999 PR PBB SHADOW E&M-EST. PATIENT-LVL V: ICD-10-PCS | Mod: PBBFAC,,, | Performed by: INTERNAL MEDICINE

## 2020-01-15 PROCEDURE — 99205 PR OFFICE/OUTPT VISIT, NEW, LEVL V, 60-74 MIN: ICD-10-PCS | Mod: S$PBB,,, | Performed by: INTERNAL MEDICINE

## 2020-01-15 PROCEDURE — 1159F MED LIST DOCD IN RCRD: CPT | Mod: ,,, | Performed by: INTERNAL MEDICINE

## 2020-01-15 RX ORDER — TIOTROPIUM BROMIDE 18 UG/1
CAPSULE ORAL; RESPIRATORY (INHALATION)
COMMUNITY
Start: 2019-12-20

## 2020-01-15 RX ORDER — ALBUTEROL SULFATE 0.83 MG/ML
SOLUTION RESPIRATORY (INHALATION)
COMMUNITY
Start: 2019-11-22

## 2020-01-15 RX ORDER — ERYTHROMYCIN 5 MG/G
OINTMENT OPHTHALMIC
COMMUNITY
Start: 2019-12-08

## 2020-01-15 RX ORDER — CELECOXIB 100 MG/1
CAPSULE ORAL
COMMUNITY
Start: 2019-10-21

## 2020-01-15 RX ORDER — FLUOROMETHOLONE 1 MG/ML
SUSPENSION/ DROPS OPHTHALMIC
COMMUNITY
Start: 2019-11-17

## 2020-01-15 RX ORDER — PANCRELIPASE 24000; 76000; 120000 [USP'U]/1; [USP'U]/1; [USP'U]/1
CAPSULE, DELAYED RELEASE PELLETS ORAL
COMMUNITY
Start: 2020-01-09

## 2020-01-15 RX ORDER — MONTELUKAST SODIUM 10 MG/1
TABLET ORAL
COMMUNITY
Start: 2020-01-09

## 2020-01-15 RX ORDER — CHOLESTYRAMINE 4 G/9G
POWDER, FOR SUSPENSION ORAL
COMMUNITY
Start: 2019-11-01

## 2020-01-15 RX ORDER — HYOSCYAMINE SULFATE 0.12 MG/5ML
ELIXIR ORAL
COMMUNITY
Start: 2020-01-08

## 2020-01-15 RX ORDER — POTASSIUM CHLORIDE 750 MG/1
30 TABLET, EXTENDED RELEASE ORAL
COMMUNITY
Start: 2020-01-09

## 2020-01-15 RX ORDER — UMECLIDINIUM 62.5 UG/1
AEROSOL, POWDER ORAL
COMMUNITY
Start: 2019-10-28

## 2020-01-15 RX ORDER — CEFDINIR 300 MG/1
CAPSULE ORAL
COMMUNITY
Start: 2019-11-21

## 2020-01-15 NOTE — PROGRESS NOTES
CC I am unsure why I am here    Franchesca Best is a 74 y.o.  This is a 74 yr old female who underwent a biopsy of the RML infiltrate on December 19 2019 which revealed adenocarcinoma. She has not had any other imaging studies   She has had a 10 lb weight loss over the past couple years.  She has lost her  2 mesothelioma approximately 1 year ago and resides in a in assisted living.  SHe has brought power of  papers from [facility] stating that she was diagnosed with dementia since 2017. Today pt is coherent alert and oriented . SHe is a good historian   She is voicing that she has never given her daughter baldemar power of  and she wants this revoked     CT of chest without contrast November 26, 2019  Dense consolidation of the right middle lobe, stable emphysematous changes bilaterally compared to a prior scan performed in August of 2019.  No axillary mediastinal adenopathy stable cardiomegaly low attenuating lesions of the liver were noted however were noted to be stable and most likely reflecting cysts.  Osseous structures appear stable    Past Medical History:   Diagnosis Date    Age related osteoporosis     Anxiety disorder due to known physiological condition     Central retinal vein occlusion, unspecified eye, with retinal neovascularization     Chronic kidney disease     Diarrhea, unspecified     Emphysema, unspecified     Epilepsy     GERD (gastroesophageal reflux disease)     Hyperlipidemia     Low back pain     Moderate protein-calorie malnutrition     Other forms of dyspnea     Other seizures     Personal history of (healed) traumatic fracture     Personal history of other diseases of the respiratory system     Personal history of urinary (tract) infections     Postgastric surgery syndromes     Postsurgical malabsorption, not elsewhere classified     Retinal ischemia     Shortness of breath     Tachycardia, unspecified     Unspecified dementia without  behavioral disturbance      Past Surgical History:   Procedure Laterality Date    CORONARY STENT PLACEMENT         Current Outpatient Medications:     acetaminophen (TYLENOL) 325 MG tablet, Take 650 mg by mouth every 6 (six) hours as needed for Pain., Disp: , Rfl:     ALBUTEROL SULFATE INHL, Inhale into the lungs., Disp: , Rfl:     albuterol-ipratropium (DUO-NEB) 2.5 mg-0.5 mg/3 mL nebulizer solution, Take 3 mLs by nebulization every 6 (six) hours as needed for Wheezing. Rescue, Disp: , Rfl:     ALPRAZolam (XANAX) 0.25 MG tablet, Take by mouth 3 (three) times daily., Disp: , Rfl:     aluminum-magnesium hydroxide-simethicone-lidocaine HCl 2%-phenobarb-hyoscy-atropine-scop, Take by mouth 2 (two) times daily., Disp: , Rfl:     calcium-vitamin D3 (CALCIUM 500 + D) 500 mg(1,250mg) -200 unit per tablet, Take 1 tablet by mouth 2 (two) times daily with meals., Disp: , Rfl:     cholestyramine/aspartame (CHOLESTYRAMINE LIGHT ORAL), Take by mouth., Disp: , Rfl:     cloNIDine 0.1 mg/24 hr td ptwk (CATAPRES) 0.1 mg/24 hr, Place 1 patch onto the skin every 7 days., Disp: , Rfl:     diphenoxylate-atropine 2.5-0.025 mg (LOMOTIL) 2.5-0.025 mg per tablet, Take 1 tablet by mouth 4 (four) times daily as needed for Diarrhea., Disp: , Rfl:     donepezil (ARICEPT) 10 MG tablet, Take 10 mg by mouth every evening., Disp: , Rfl:     escitalopram oxalate (LEXAPRO) 10 MG tablet, Take 15 mg by mouth once daily., Disp: , Rfl:     escitalopram oxalate (LEXAPRO) 5 MG Tab, Take 5 mg by mouth once daily., Disp: , Rfl:     ezetimibe (ZETIA) 10 mg tablet, Take 10 mg by mouth once daily., Disp: , Rfl:     fluticasone (FLONASE) 50 mcg/actuation nasal spray, 1 spray by Each Nare route once daily., Disp: , Rfl:     furosemide (LASIX) 20 MG tablet, Take 20 mg by mouth 2 (two) times daily., Disp: , Rfl:     gabapentin (NEURONTIN) 100 MG capsule, Take 100 mg by mouth 3 (three) times daily., Disp: , Rfl:     GUAIFENESIN ORAL, Take by mouth.,  Disp: , Rfl:     lidocaine (ASPERCREME, LIDOCAINE,) 4 % PtMd, Apply topically., Disp: , Rfl:     lipase/protease/amylase (CREON ORAL), Take by mouth., Disp: , Rfl:     loperamide (IMODIUM A-D) 2 mg Tab, Take 2 mg by mouth 4 (four) times daily as needed., Disp: , Rfl:     loratadine (CLARITIN) 10 mg tablet, Take 10 mg by mouth once daily., Disp: , Rfl:     magnesium oxide (MAG-OX) 400 mg (241.3 mg magnesium) tablet, Take 400 mg by mouth once daily., Disp: , Rfl:     midodrine (PROAMATINE) 2.5 MG Tab, Take 2.5 mg by mouth 3 (three) times daily., Disp: , Rfl:     ondansetron (ZOFRAN) 4 MG tablet, Take 4 mg by mouth every 8 (eight) hours as needed for Nausea., Disp: , Rfl:     phenytoin (DILANTIN-125) 125 mg/5 mL suspension, Take 100 mg by mouth every morning., Disp: , Rfl:     phenytoin (DILANTIN-125) 125 mg/5 mL suspension, Take 130 mg by mouth every evening., Disp: , Rfl:     polyvinyl alcohol, artificial tears, (LIQUIFILM TEARS) 1.4 % ophthalmic solution, 1 drop as needed., Disp: , Rfl:     potassium chloride SA (K-DUR,KLOR-CON) 20 MEQ tablet, Take 40 mEq by mouth 2 (two) times daily., Disp: , Rfl:     ranitidine (ZANTAC) 150 MG capsule, Take 150 mg by mouth 2 (two) times daily., Disp: , Rfl:     traMADol (ULTRAM) 50 mg tablet, Take 50 mg by mouth every 6 (six) hours as needed for Pain., Disp: , Rfl:   Review of patient's allergies indicates:   Allergen Reactions    Coumadin [warfarin]     Tetanus vaccines and toxoid      Social History     Tobacco Use    Smoking status: Former Smoker     Last attempt to quit: 6/3/2015     Years since quittin.6   Substance Use Topics    Alcohol use: No     Frequency: Never    Drug use: Not on file     No family history on file.    CONSTITUTIONAL: No fevers, chills, night sweats, wt. loss, appetite changes  SKIN: no rashes or itching  ENT: No headaches, head trauma, vision changes, or eye pain  LYMPH NODES: None noticed   CV: No chest pain, palpitations.   RESP:  "No dyspnea on exertion, cough, wheezing, or hemoptysis  GI: No nausea, emesis, diarrhea, constipation, melena, hematochezia, pain.   : No dysuria, hematuria, urgency, or frequency   HEME: No easy bruising, bleeding problems  PSYCHIATRIC: No depression, anxiety, psychosis, hallucinations.  NEURO: No seizures, memory loss, dizziness or difficulty sleeping  MSK: No arthralgias or joint swelling         /63   Pulse 64   Temp 98.3 °F (36.8 °C) (Oral)   Resp 18   Ht 5' 3" (1.6 m)   Wt 44.9 kg (99 lb)   SpO2 95%   BMI 17.54 kg/m²   Gen: NAD, A and O x3,   Psych: pleasant affect, normal thought process  Eyes: Pupils round and non dilated, EOM intact  Nose: Nares patent  OP clear, mucosa patent  Neck: suppple, no JVD, trachea midline, no palpable mass, no adenopathy  Lungs: CTAB, no wheezes, no use of accessory muscles  CV: S1S2 with RRR, No mrg  Abd: soft, NTND, + BS, No HSM, no ascites  Extr: No CCE, NANCY, strength normal, good capillary refill  Neuro: steady gait with walker , CNs grossly intact  Skin: intact, no lesions noted  Rheum: No joint swelling    Lab Results   Component Value Date    WBC 6.73 11/05/2019    HGB 13.1 11/05/2019    HCT 40.8 11/05/2019     (H) 11/05/2019     11/05/2019         CMP  Sodium   Date Value Ref Range Status   11/21/2019 138 136 - 145 mmol/L Final     Potassium   Date Value Ref Range Status   11/21/2019 4.2 3.5 - 5.1 mmol/L Final     Chloride   Date Value Ref Range Status   11/21/2019 105 95 - 110 mmol/L Final     CO2   Date Value Ref Range Status   11/21/2019 24 23 - 29 mmol/L Final     Glucose   Date Value Ref Range Status   11/21/2019 82 70 - 110 mg/dL Final     BUN, Bld   Date Value Ref Range Status   11/21/2019 16 8 - 23 mg/dL Final     Creatinine   Date Value Ref Range Status   11/21/2019 0.8 0.5 - 1.4 mg/dL Final     Calcium   Date Value Ref Range Status   11/21/2019 8.2 (L) 8.7 - 10.5 mg/dL Final     Total Protein   Date Value Ref Range Status "   11/21/2019 5.5 (L) 6.0 - 8.4 g/dL Final     Albumin   Date Value Ref Range Status   11/21/2019 3.5 3.5 - 5.2 g/dL Final     Total Bilirubin   Date Value Ref Range Status   11/21/2019 0.6 0.1 - 1.0 mg/dL Final     Comment:     For infants and newborns, interpretation of results should be based  on gestational age, weight and in agreement with clinical  observations.  Premature Infant recommended reference ranges:  Up to 24 hours.............<8.0 mg/dL  Up to 48 hours............<12.0 mg/dL  3-5 days..................<15.0 mg/dL  6-29 days.................<15.0 mg/dL       Alkaline Phosphatase   Date Value Ref Range Status   11/21/2019 52 (L) 55 - 135 U/L Final     AST   Date Value Ref Range Status   11/21/2019 16 10 - 40 U/L Final     ALT   Date Value Ref Range Status   11/21/2019 16 10 - 44 U/L Final     Anion Gap   Date Value Ref Range Status   11/21/2019 9 8 - 16 mmol/L Final     eGFR if    Date Value Ref Range Status   11/21/2019 >60.0 >60 mL/min/1.73 m^2 Final     eGFR if non    Date Value Ref Range Status   11/21/2019 >60.0 >60 mL/min/1.73 m^2 Final     Comment:     Calculation used to obtain the estimated glomerular filtration  rate (eGFR) is the CKD-EPI equation.          Primary lung adenocarcinoma, right  -     NM PET CT Routine Skull to Mid Thigh; Future; Expected date: 01/15/2020  -     MRI BRAIN W WO CONTRAST; Future; Expected date: 01/15/2020  -     CBC auto differential; Future; Expected date: 01/15/2020  -     CMP; Future; Expected date: 01/15/2020  -     CEA; Future; Expected date: 01/15/2020    Other nonspecific abnormal finding of lung field   -     NM PET CT Routine Skull to Mid Thigh; Future; Expected date: 01/15/2020        1.  Right middle lobe lung adenocarcinoma  2.  Staging workup indicated to include MRI of brain as well as PET-CT scan, add cea and labs   3.  Patient with possible documentation of dementia?  She denies this diagnosis and does not want to  give power of  to her daughter.  She has not signed any [employer] bake per work that we can see however I will escalate this issue to [facility]'s egal Department for further direction    RCT after labs and scans for staging discussion and treatment options   Thank you for allowing me to evaluate and participate in the care of this pleasant patient. Please fell free to call me with any questions or concerns.    Warmly,  Mariya Mercado MD    This note was dictated with Dragon and briefly proofread. Please excuse any sentences that may be unclear or words misspelled

## 2020-01-15 NOTE — TELEPHONE ENCOUNTER
Left message for ines to return call.         ----- Message from Cory Joy sent at 1/15/2020 12:17 PM CST -----  Contact: pt's daughter Ines  Type: Needs Medical Advice    Who Called:  Ines    Best Call Back Number: [phone]  Additional Information: Would like to speak to nurse concerning the pt. Would not give any further information. Please call to advise.

## 2020-01-16 ENCOUNTER — TELEPHONE (OUTPATIENT)
Dept: HEMATOLOGY/ONCOLOGY | Facility: CLINIC | Age: 75
End: 2020-01-16

## 2020-01-16 DIAGNOSIS — C34.91 PRIMARY LUNG ADENOCARCINOMA, RIGHT: Primary | ICD-10-CM

## 2020-01-16 NOTE — TELEPHONE ENCOUNTER
Duplicate message        ----- Message from Tatiana Pisano sent at 1/16/2020 10:11 AM CST -----  Type:  Patient Returning Call    Who Called: Daughter (Ines)  Who Left Message for Patient:  Mildred Young  Does the patient know what this is regarding?:  patient  Best Call Back Number:  [phone]  Additional Information:  Please leave message if no answer with a time that is good to call

## 2020-01-16 NOTE — TELEPHONE ENCOUNTER
LM informing pt daughter Dr. Mercado is okay c pt only having MRI of brain c contrast. No need for s contrast. Encouraged call back with questions.     ----- Message from Tatiana Pisano sent at 1/16/2020  1:53 PM CST -----  Type: Needs Medical Advice    Who Called:  Daughter Ismael)  Best Call Back Number: [phone]  Additional Information: Daughter needs to speak with nurse concerning scheduling patient for MRI/stated patient is unable to lay on table for an hour/needs to know if can be done on two separate appointments instead/please call back to advise. (Please leave message if no answer)

## 2020-01-27 ENCOUNTER — HOSPITAL ENCOUNTER (OUTPATIENT)
Dept: RADIOLOGY | Facility: HOSPITAL | Age: 75
Discharge: HOME | End: 2020-01-27
Attending: INTERNAL MEDICINE
Payer: MEDICARE

## 2020-01-27 VITALS — HEIGHT: 63 IN | BODY MASS INDEX: 16.48 KG/M2 | WEIGHT: 93 LBS

## 2020-01-27 DIAGNOSIS — R91.8 OTHER NONSPECIFIC ABNORMAL FINDING OF LUNG FIELD: ICD-10-CM

## 2020-01-27 DIAGNOSIS — C34.91 PRIMARY LUNG ADENOCARCINOMA, RIGHT: ICD-10-CM

## 2020-01-27 LAB — GLUCOSE SERPL-MCNC: 99 MG/DL (ref 70–110)

## 2020-01-27 PROCEDURE — A9552 F18 FDG: HCPCS | Mod: PO

## 2020-01-27 PROCEDURE — 78815 PET IMAGE W/CT SKULL-THIGH: CPT | Mod: TC,PO

## 2020-01-29 ENCOUNTER — TELEPHONE (OUTPATIENT)
Dept: HEMATOLOGY/ONCOLOGY | Facility: CLINIC | Age: 75
End: 2020-01-29

## 2020-01-29 NOTE — TELEPHONE ENCOUNTER
Informed pt daughter Dr Mercado is out of the office, but this nurse will make Margarita aware of pt daughter request. Pt daughter also requesting to reschedule 2/5 appt to 2/10 at 1300. Pt daughter confirmed new appt.     ----- Message from Nelli Wilkins sent at 1/29/2020  2:53 PM CST -----  Type:  Test Results    Who Called:  Daughter/ Ines Bryan  Name of Test (Lab/Mammo/Etc):  Pet Scan  Date of Test:  1/27/20  Ordering Provider:  same  Where the test was performed:  [facility]  Best Call Back Number:  [phone] (home)

## 2020-01-31 ENCOUNTER — TELEPHONE (OUTPATIENT)
Dept: HEMATOLOGY/ONCOLOGY | Facility: CLINIC | Age: 75
End: 2020-01-31

## 2020-01-31 NOTE — TELEPHONE ENCOUNTER
"Informed pt daughter PET scan showed "no metastatic disease" per Dr. Mercado. Pt daughter verbalized understanding.     "

## 2020-02-10 ENCOUNTER — TELEPHONE (OUTPATIENT)
Dept: HEMATOLOGY/ONCOLOGY | Facility: CLINIC | Age: 75
End: 2020-02-10

## 2020-02-10 NOTE — TELEPHONE ENCOUNTER
Patient's daughter Ines called to reschedule appointment for today as she has the flu and cant bring patient in today.   She would like Dr Mercado to call her to discuss treatment plan. Message given to Dr Mercado

## 2020-02-12 ENCOUNTER — TELEPHONE (OUTPATIENT)
Dept: HEMATOLOGY/ONCOLOGY | Facility: CLINIC | Age: 75
End: 2020-02-12

## 2020-02-12 NOTE — TELEPHONE ENCOUNTER
----- Message from Summer Connolly sent at 2/12/2020 10:09 AM CST -----  Contact: Ines/Daughter  Type: Needs Medical Advice    Who Called:  Ines  Best Call Back Number: [phone]  Additional Information: Ines states that she would like to talk to the nurse before apt. On 2/24 has questions, Ines can be reached all day on Friday 2/14.  Please call to advise.  Thanks!

## 2020-02-14 ENCOUNTER — TELEPHONE (OUTPATIENT)
Dept: HEMATOLOGY/ONCOLOGY | Facility: CLINIC | Age: 75
End: 2020-02-14

## 2020-02-24 ENCOUNTER — TELEPHONE (OUTPATIENT)
Dept: HEMATOLOGY/ONCOLOGY | Facility: CLINIC | Age: 75
End: 2020-02-24

## 2020-02-24 ENCOUNTER — LAB VISIT (OUTPATIENT)
Dept: LAB | Facility: HOSPITAL | Age: 75
End: 2020-02-24
Attending: INTERNAL MEDICINE
Payer: MEDICARE

## 2020-02-24 DIAGNOSIS — I95.1 ORTHOSTATIC HYPOTENSION: Primary | ICD-10-CM

## 2020-02-24 DIAGNOSIS — I10 ESSENTIAL HYPERTENSION, MALIGNANT: ICD-10-CM

## 2020-02-24 DIAGNOSIS — J44.89 OBSTRUCTIVE CHRONIC BRONCHITIS WITHOUT EXACERBATION: ICD-10-CM

## 2020-02-24 LAB
ALBUMIN SERPL BCP-MCNC: 3.8 G/DL (ref 3.5–5.2)
ALP SERPL-CCNC: 49 U/L (ref 55–135)
ALT SERPL W/O P-5'-P-CCNC: 25 U/L (ref 10–44)
ANION GAP SERPL CALC-SCNC: 8 MMOL/L (ref 8–16)
AST SERPL-CCNC: 24 U/L (ref 10–40)
BILIRUB DIRECT SERPL-MCNC: <0.1 MG/DL (ref 0.1–0.3)
BILIRUB SERPL-MCNC: 0.5 MG/DL (ref 0.1–1)
BUN SERPL-MCNC: 14 MG/DL (ref 8–23)
CALCIUM SERPL-MCNC: 8.5 MG/DL (ref 8.7–10.5)
CHLORIDE SERPL-SCNC: 103 MMOL/L (ref 95–110)
CHOLEST SERPL-MCNC: 162 MG/DL (ref 120–199)
CHOLEST/HDLC SERPL: 1.9 {RATIO} (ref 2–5)
CO2 SERPL-SCNC: 26 MMOL/L (ref 23–29)
CREAT SERPL-MCNC: 0.7 MG/DL (ref 0.5–1.4)
EST. GFR  (AFRICAN AMERICAN): >60 ML/MIN/1.73 M^2
EST. GFR  (NON AFRICAN AMERICAN): >60 ML/MIN/1.73 M^2
GLUCOSE SERPL-MCNC: 80 MG/DL (ref 70–110)
HDLC SERPL-MCNC: 87 MG/DL (ref 40–75)
HDLC SERPL: 53.7 % (ref 20–50)
LDLC SERPL CALC-MCNC: 57.8 MG/DL (ref 63–159)
NONHDLC SERPL-MCNC: 75 MG/DL
PHENYTOIN SERPL-MCNC: 9.1 UG/ML (ref 10–20)
POTASSIUM SERPL-SCNC: 3.3 MMOL/L (ref 3.5–5.1)
PROT SERPL-MCNC: 6.4 G/DL (ref 6–8.4)
SODIUM SERPL-SCNC: 137 MMOL/L (ref 136–145)
TRIGL SERPL-MCNC: 86 MG/DL (ref 30–150)

## 2020-02-24 PROCEDURE — 80076 HEPATIC FUNCTION PANEL: CPT

## 2020-02-24 PROCEDURE — 80061 LIPID PANEL: CPT

## 2020-02-24 PROCEDURE — 80048 BASIC METABOLIC PNL TOTAL CA: CPT

## 2020-02-24 PROCEDURE — 80185 ASSAY OF PHENYTOIN TOTAL: CPT

## 2020-02-24 PROCEDURE — 36415 COLL VENOUS BLD VENIPUNCTURE: CPT

## 2020-02-25 NOTE — TELEPHONE ENCOUNTER
LEft message letting Melissa know the lab appt for today was not for DR francisco or hematology oncology. Apologized that she was unaware of appt but that the next appt for labs for our office is on 3/4----- Message from Breanna Paulino sent at 2/24/2020  2:22 PM CST -----  Contact: patient daughter  [phone]-please call patient marcelino Chacon need to speak with the nurse concerning lab appointment said she did not know about this waiting on a call from the nurse ASAP thanks.

## 2020-03-03 ENCOUNTER — LAB VISIT (OUTPATIENT)
Dept: LAB | Facility: HOSPITAL | Age: 75
End: 2020-03-03
Attending: INTERNAL MEDICINE
Payer: MEDICARE

## 2020-03-03 DIAGNOSIS — I42.9 FAMILIAL CARDIOMYOPATHY: ICD-10-CM

## 2020-03-03 DIAGNOSIS — D64.9 ANEMIA, UNSPECIFIED: Primary | ICD-10-CM

## 2020-03-03 DIAGNOSIS — E87.6 HYPOPOTASSEMIA: ICD-10-CM

## 2020-03-03 DIAGNOSIS — C34.91 PRIMARY LUNG ADENOCARCINOMA, RIGHT: ICD-10-CM

## 2020-03-03 LAB
ALBUMIN SERPL BCP-MCNC: 3.7 G/DL (ref 3.5–5.2)
ALP SERPL-CCNC: 52 U/L (ref 55–135)
ALT SERPL W/O P-5'-P-CCNC: 28 U/L (ref 10–44)
ANION GAP SERPL CALC-SCNC: 10 MMOL/L (ref 8–16)
AST SERPL-CCNC: 27 U/L (ref 10–40)
BILIRUB SERPL-MCNC: 0.5 MG/DL (ref 0.1–1)
BUN SERPL-MCNC: 15 MG/DL (ref 8–23)
CALCIUM SERPL-MCNC: 8.6 MG/DL (ref 8.7–10.5)
CHLORIDE SERPL-SCNC: 102 MMOL/L (ref 95–110)
CO2 SERPL-SCNC: 26 MMOL/L (ref 23–29)
CREAT SERPL-MCNC: 1 MG/DL (ref 0.5–1.4)
CREAT SERPL-MCNC: 1 MG/DL (ref 0.5–1.4)
EST. GFR  (AFRICAN AMERICAN): >60 ML/MIN/1.73 M^2
EST. GFR  (AFRICAN AMERICAN): >60 ML/MIN/1.73 M^2
EST. GFR  (NON AFRICAN AMERICAN): 54.9 ML/MIN/1.73 M^2
EST. GFR  (NON AFRICAN AMERICAN): 54.9 ML/MIN/1.73 M^2
GLUCOSE SERPL-MCNC: 77 MG/DL (ref 70–110)
POTASSIUM SERPL-SCNC: 4.1 MMOL/L (ref 3.5–5.1)
PROT SERPL-MCNC: 5.1 G/DL (ref 6–8.4)
SODIUM SERPL-SCNC: 138 MMOL/L (ref 136–145)

## 2020-03-03 PROCEDURE — 36415 COLL VENOUS BLD VENIPUNCTURE: CPT

## 2020-03-03 PROCEDURE — 80053 COMPREHEN METABOLIC PANEL: CPT

## 2020-03-05 ENCOUNTER — LAB VISIT (OUTPATIENT)
Dept: LAB | Facility: HOSPITAL | Age: 75
End: 2020-03-05
Attending: INTERNAL MEDICINE
Payer: MEDICARE

## 2020-03-05 DIAGNOSIS — D64.9 ANEMIA, UNSPECIFIED: Primary | ICD-10-CM

## 2020-03-05 LAB
BASOPHILS # BLD AUTO: 0.04 K/UL (ref 0–0.2)
BASOPHILS NFR BLD: 0.7 % (ref 0–1.9)
DIFFERENTIAL METHOD: ABNORMAL
EOSINOPHIL # BLD AUTO: 0.1 K/UL (ref 0–0.5)
EOSINOPHIL NFR BLD: 1.4 % (ref 0–8)
ERYTHROCYTE [DISTWIDTH] IN BLOOD BY AUTOMATED COUNT: 13.2 % (ref 11.5–14.5)
HCT VFR BLD AUTO: 41.5 % (ref 37–48.5)
HGB BLD-MCNC: 13.4 G/DL (ref 12–16)
IMM GRANULOCYTES # BLD AUTO: 0.01 K/UL (ref 0–0.04)
IMM GRANULOCYTES NFR BLD AUTO: 0.2 % (ref 0–0.5)
LYMPHOCYTES # BLD AUTO: 2.3 K/UL (ref 1–4.8)
LYMPHOCYTES NFR BLD: 40.8 % (ref 18–48)
MCH RBC QN AUTO: 32.6 PG (ref 27–31)
MCHC RBC AUTO-ENTMCNC: 32.3 G/DL (ref 32–36)
MCV RBC AUTO: 101 FL (ref 82–98)
MONOCYTES # BLD AUTO: 0.5 K/UL (ref 0.3–1)
MONOCYTES NFR BLD: 8.5 % (ref 4–15)
NEUTROPHILS # BLD AUTO: 2.7 K/UL (ref 1.8–7.7)
NEUTROPHILS NFR BLD: 48.4 % (ref 38–73)
NRBC BLD-RTO: 0 /100 WBC
PLATELET # BLD AUTO: 209 K/UL (ref 150–350)
PMV BLD AUTO: 9.5 FL (ref 9.2–12.9)
RBC # BLD AUTO: 4.11 M/UL (ref 4–5.4)
WBC # BLD AUTO: 5.64 K/UL (ref 3.9–12.7)

## 2020-03-05 PROCEDURE — 36415 COLL VENOUS BLD VENIPUNCTURE: CPT

## 2020-03-05 PROCEDURE — 85025 COMPLETE CBC W/AUTO DIFF WBC: CPT

## 2020-03-11 ENCOUNTER — TELEPHONE (OUTPATIENT)
Dept: HEMATOLOGY/ONCOLOGY | Facility: CLINIC | Age: 75
End: 2020-03-11

## 2020-03-11 ENCOUNTER — OFFICE VISIT (OUTPATIENT)
Dept: HEMATOLOGY/ONCOLOGY | Facility: CLINIC | Age: 75
End: 2020-03-11
Payer: MEDICARE

## 2020-03-11 VITALS
RESPIRATION RATE: 14 BRPM | SYSTOLIC BLOOD PRESSURE: 137 MMHG | BODY MASS INDEX: 18.07 KG/M2 | WEIGHT: 102 LBS | DIASTOLIC BLOOD PRESSURE: 61 MMHG | TEMPERATURE: 99 F | HEIGHT: 63 IN | HEART RATE: 65 BPM | OXYGEN SATURATION: 85 %

## 2020-03-11 DIAGNOSIS — C34.91 PRIMARY ADENOCARCINOMA OF RIGHT LUNG: ICD-10-CM

## 2020-03-11 PROCEDURE — 99999 PR PBB SHADOW E&M-EST. PATIENT-LVL V: CPT | Mod: PBBFAC,,, | Performed by: INTERNAL MEDICINE

## 2020-03-11 PROCEDURE — 99215 PR OFFICE/OUTPT VISIT, EST, LEVL V, 40-54 MIN: ICD-10-PCS | Mod: S$PBB,,, | Performed by: INTERNAL MEDICINE

## 2020-03-11 PROCEDURE — 99215 OFFICE O/P EST HI 40 MIN: CPT | Mod: PBBFAC | Performed by: INTERNAL MEDICINE

## 2020-03-11 PROCEDURE — G0444 DEPRESSION SCREEN ANNUAL: HCPCS | Mod: PBBFAC | Performed by: INTERNAL MEDICINE

## 2020-03-11 PROCEDURE — 94760 N-INVAS EAR/PLS OXIMETRY 1: CPT | Mod: PBBFAC | Performed by: INTERNAL MEDICINE

## 2020-03-11 PROCEDURE — 99215 OFFICE O/P EST HI 40 MIN: CPT | Mod: S$PBB,,, | Performed by: INTERNAL MEDICINE

## 2020-03-11 PROCEDURE — 99999 PR PBB SHADOW E&M-EST. PATIENT-LVL V: ICD-10-PCS | Mod: PBBFAC,,, | Performed by: INTERNAL MEDICINE

## 2020-03-11 NOTE — PROGRESS NOTES
CC how is my scan    Franchesca Best is a 74 y.o.  This is a 74 yr old female who underwent a biopsy of the RML infiltrate on December 19 2019 which revealed adenocarcinoma. She has not had any other imaging studies   She has had a 10 lb weight loss over the past couple years.  She has lost her  2 mesothelioma approximately 1 year ago and resides in a in assisted living.  SHe has brought power of  papers from [facility] stating that she was diagnosed with dementia since 2017. Today pt is coherent alert and oriented . SHe is a good historian   She is voicing that she has never given her daughter baldemar power of  and she wants this revoked     CT of chest without contrast November 26, 2019  Dense consolidation of the right middle lobe, stable emphysematous changes bilaterally compared to a prior scan performed in August of 2019.  No axillary mediastinal adenopathy stable cardiomegaly low attenuating lesions of the liver were noted however were noted to be stable and most likely reflecting cysts.  Osseous structures appear stable    March 11, 2020  PET-CT reveals mildly hypermetabolic wedge shaped opacity with associated consolidation the right middle lobe centrally compatible with provided history of adenocarcinoma, no findings of regional metastatic disease in the chest or extrathoracic metastatic disease, patient is here to discuss her treatment options    Past Medical History:   Diagnosis Date    Age related osteoporosis     Anxiety disorder due to known physiological condition     Central retinal vein occlusion, unspecified eye, with retinal neovascularization     Chronic kidney disease     Diarrhea, unspecified     Emphysema, unspecified     Epilepsy     GERD (gastroesophageal reflux disease)     Hyperlipidemia     Low back pain     Moderate protein-calorie malnutrition     Other forms of dyspnea     Other seizures     Personal history of (healed) traumatic fracture      Personal history of other diseases of the respiratory system     Personal history of urinary (tract) infections     Postgastric surgery syndromes     Postsurgical malabsorption, not elsewhere classified     Retinal ischemia     Shortness of breath     Tachycardia, unspecified     Unspecified dementia without behavioral disturbance      Past Surgical History:   Procedure Laterality Date    CORONARY STENT PLACEMENT         Current Outpatient Medications:     acetaminophen (TYLENOL) 325 MG tablet, Take 650 mg by mouth every 6 (six) hours as needed for Pain., Disp: , Rfl:     albuterol (PROVENTIL) 2.5 mg /3 mL (0.083 %) nebulizer solution, , Disp: , Rfl:     ALBUTEROL SULFATE INHL, Inhale into the lungs., Disp: , Rfl:     albuterol-ipratropium (DUO-NEB) 2.5 mg-0.5 mg/3 mL nebulizer solution, Take 3 mLs by nebulization every 6 (six) hours as needed for Wheezing. Rescue, Disp: , Rfl:     ALPRAZolam (XANAX) 0.25 MG tablet, Take by mouth 3 (three) times daily., Disp: , Rfl:     aluminum-magnesium hydroxide-simethicone-lidocaine HCl 2%-phenobarb-hyoscy-atropine-scop, Take by mouth 2 (two) times daily., Disp: , Rfl:     calcium-vitamin D3 (CALCIUM 500 + D) 500 mg(1,250mg) -200 unit per tablet, Take 1 tablet by mouth 2 (two) times daily with meals., Disp: , Rfl:     celecoxib (CELEBREX) 100 MG capsule, , Disp: , Rfl:     cholestyramine (QUESTRAN) 4 gram packet, , Disp: , Rfl:     cholestyramine/aspartame (CHOLESTYRAMINE LIGHT ORAL), Take by mouth., Disp: , Rfl:     cloNIDine 0.1 mg/24 hr td ptwk (CATAPRES) 0.1 mg/24 hr, Place 1 patch onto the skin every 7 days., Disp: , Rfl:     CREON 24,000-76,000 -120,000 unit capsule, , Disp: , Rfl:     diphenoxylate-atropine 2.5-0.025 mg (LOMOTIL) 2.5-0.025 mg per tablet, Take 1 tablet by mouth 4 (four) times daily as needed for Diarrhea., Disp: , Rfl:     donepezil (ARICEPT) 10 MG tablet, Take 10 mg by mouth every evening., Disp: , Rfl:     erythromycin  (ROMYCIN) ophthalmic ointment, , Disp: , Rfl:     escitalopram oxalate (LEXAPRO) 10 MG tablet, Take 15 mg by mouth once daily., Disp: , Rfl:     escitalopram oxalate (LEXAPRO) 5 MG Tab, Take 5 mg by mouth once daily., Disp: , Rfl:     ezetimibe (ZETIA) 10 mg tablet, Take 10 mg by mouth once daily., Disp: , Rfl:     fluorometholone 0.1% (FML) 0.1 % DrpS, , Disp: , Rfl:     fluticasone (FLONASE) 50 mcg/actuation nasal spray, 1 spray by Each Nare route once daily., Disp: , Rfl:     furosemide (LASIX) 20 MG tablet, Take 20 mg by mouth 2 (two) times daily., Disp: , Rfl:     gabapentin (NEURONTIN) 100 MG capsule, Take 100 mg by mouth 3 (three) times daily., Disp: , Rfl:     GUAIFENESIN ORAL, Take by mouth., Disp: , Rfl:     HYOSYNE 0.125 mg/5 mL Elix, , Disp: , Rfl:     INCRUSE ELLIPTA 62.5 mcg/actuation DsDv, , Disp: , Rfl:     lidocaine (ASPERCREME, LIDOCAINE,) 4 % PtMd, Apply topically., Disp: , Rfl:     lipase/protease/amylase (CREON ORAL), Take by mouth., Disp: , Rfl:     loperamide (IMODIUM A-D) 2 mg Tab, Take 2 mg by mouth 4 (four) times daily as needed., Disp: , Rfl:     loratadine (CLARITIN) 10 mg tablet, Take 10 mg by mouth once daily., Disp: , Rfl:     magnesium oxide (MAG-OX) 400 mg (241.3 mg magnesium) tablet, Take 400 mg by mouth once daily., Disp: , Rfl:     midodrine (PROAMATINE) 2.5 MG Tab, Take 2.5 mg by mouth 3 (three) times daily., Disp: , Rfl:     montelukast (SINGULAIR) 10 mg tablet, , Disp: , Rfl:     ondansetron (ZOFRAN) 4 MG tablet, Take 4 mg by mouth every 8 (eight) hours as needed for Nausea., Disp: , Rfl:     phenytoin (DILANTIN-125) 125 mg/5 mL suspension, Take 100 mg by mouth every morning., Disp: , Rfl:     polyvinyl alcohol, artificial tears, (LIQUIFILM TEARS) 1.4 % ophthalmic solution, 1 drop as needed., Disp: , Rfl:     potassium chloride SA (K-DUR,KLOR-CON) 10 MEQ tablet, Take 30 mEq by mouth. , Disp: , Rfl:     potassium chloride SA (K-DUR,KLOR-CON) 20 MEQ tablet,  "Take 40 mEq by mouth 2 (two) times daily., Disp: , Rfl:     ranitidine (ZANTAC) 150 MG tablet, , Disp: , Rfl:     SPIRIVA WITH HANDIHALER 18 mcg inhalation capsule, , Disp: , Rfl:     traMADol (ULTRAM) 50 mg tablet, Take 50 mg by mouth every 6 (six) hours as needed for Pain., Disp: , Rfl:     cefdinir (OMNICEF) 300 MG capsule, , Disp: , Rfl:     phenytoin (DILANTIN-125) 125 mg/5 mL suspension, Take 130 mg by mouth every evening., Disp: , Rfl:     ranitidine (ZANTAC) 150 MG capsule, Take 150 mg by mouth 2 (two) times daily., Disp: , Rfl:   Review of patient's allergies indicates:   Allergen Reactions    Coumadin [warfarin]     Tetanus vaccines and toxoid      Social History     Tobacco Use    Smoking status: Former Smoker     Last attempt to quit: 6/3/2015     Years since quittin.7   Substance Use Topics    Alcohol use: No     Frequency: Never    Drug use: Not on file     History reviewed. No pertinent family history.    CONSTITUTIONAL: No fevers, chills, night sweats, wt. loss, appetite changes  SKIN: no rashes or itching  ENT: No headaches, head trauma, vision changes, or eye pain  LYMPH NODES: None noticed   CV: No chest pain, palpitations.   RESP: No dyspnea on exertion, cough, wheezing, or hemoptysis  GI: No nausea, emesis, diarrhea, constipation, melena, hematochezia, pain.   : No dysuria, hematuria, urgency, or frequency   HEME: No easy bruising, bleeding problems  PSYCHIATRIC: No depression, anxiety, psychosis, hallucinations.  NEURO: No seizures, memory loss, dizziness or difficulty sleeping  MSK: No arthralgias or joint swelling         /61 (BP Location: Left arm, Patient Position: Sitting, BP Method: Small (Automatic))   Pulse 65   Temp 98.6 °F (37 °C) (Oral)   Resp 14   Ht 5' 3" (1.6 m)   Wt 46.3 kg (102 lb)   SpO2 (!) 85%   BMI 18.07 kg/m²   Gen: NAD, A and O x3,   Psych: pleasant affect, normal thought process  Eyes: Pupils round and non dilated, EOM intact  Nose: Nares " patent  OP clear, mucosa patent  Neck: suppple, no JVD, trachea midline, no palpable mass, no adenopathy  Lungs: CTAB, no wheezes, no use of accessory muscles  CV: S1S2 with RRR, No mrg  Abd: soft, NTND, + BS, No HSM, no ascites  Extr: No CCE, NANCY, strength normal, good capillary refill  Neuro: steady gait with walker , CNs grossly intact  Skin: intact, no lesions noted  Rheum: No joint swelling    Lab Results   Component Value Date    WBC 6.73 11/05/2019    HGB 13.1 11/05/2019    HCT 40.8 11/05/2019     (H) 11/05/2019     11/05/2019         CMP  Sodium   Date Value Ref Range Status   03/03/2020 138 136 - 145 mmol/L Final     Potassium   Date Value Ref Range Status   03/03/2020 4.1 3.5 - 5.1 mmol/L Final     Chloride   Date Value Ref Range Status   03/03/2020 102 95 - 110 mmol/L Final     CO2   Date Value Ref Range Status   03/03/2020 26 23 - 29 mmol/L Final     Glucose   Date Value Ref Range Status   03/03/2020 77 70 - 110 mg/dL Final     BUN, Bld   Date Value Ref Range Status   03/03/2020 15 8 - 23 mg/dL Final     Creatinine   Date Value Ref Range Status   03/03/2020 1.0 0.5 - 1.4 mg/dL Final   03/03/2020 1.0 0.5 - 1.4 mg/dL Final     Calcium   Date Value Ref Range Status   03/03/2020 8.6 (L) 8.7 - 10.5 mg/dL Final     Total Protein   Date Value Ref Range Status   03/03/2020 5.1 (L) 6.0 - 8.4 g/dL Final     Albumin   Date Value Ref Range Status   03/03/2020 3.7 3.5 - 5.2 g/dL Final     Total Bilirubin   Date Value Ref Range Status   03/03/2020 0.5 0.1 - 1.0 mg/dL Final     Comment:     For infants and newborns, interpretation of results should be based  on gestational age, weight and in agreement with clinical  observations.  Premature Infant recommended reference ranges:  Up to 24 hours.............<8.0 mg/dL  Up to 48 hours............<12.0 mg/dL  3-5 days..................<15.0 mg/dL  6-29 days.................<15.0 mg/dL       Alkaline Phosphatase   Date Value Ref Range Status   03/03/2020 52  (L) 55 - 135 U/L Final     AST   Date Value Ref Range Status   03/03/2020 27 10 - 40 U/L Final     ALT   Date Value Ref Range Status   03/03/2020 28 10 - 44 U/L Final     Anion Gap   Date Value Ref Range Status   03/03/2020 10 8 - 16 mmol/L Final     eGFR if    Date Value Ref Range Status   03/03/2020 >60.0 >60 mL/min/1.73 m^2 Final   03/03/2020 >60.0 >60 mL/min/1.73 m^2 Final     eGFR if non    Date Value Ref Range Status   03/03/2020 54.9 (A) >60 mL/min/1.73 m^2 Final     Comment:     Calculation used to obtain the estimated glomerular filtration  rate (eGFR) is the CKD-EPI equation.      03/03/2020 54.9 (A) >60 mL/min/1.73 m^2 Final     Comment:     Calculation used to obtain the estimated glomerular filtration  rate (eGFR) is the CKD-EPI equation.        NM PET CT Routine Skull to Mid Thigh   Order: [id number]   Status:  Final result   Visible to patient:  No (Not Released) Next appt:  None Dx:  Other nonspecific abnormal finding of...   Details     Reading Physician Reading Date Result Priority   Brian Suarez MD 1/27/2020 Routine      Narrative     EXAMINATION:  NM PET CT ROUTINE    CLINICAL HISTORY:  Initial staging, initial treatment strategy of right lung adenocarcinoma, C 34.91, R 91.8, lung cancer;    TECHNIQUE:  Following the injection of 12.2 mCi of F-18 labeled FDG into a left upper forearm vein, PET CT was performed from the vertex of the skull through the proximal thighs with an integrated full ring PET CT scanner with image fusion. The patient's serum glucose at the time of the exam was 99 mg/dL.    COMPARISON:  Multiple prior exams including chest CT of 11/18/2016.    FINDINGS:  Wedge-shaped opacity with irregular margins and a few internal air bronchograms in the right middle lobe centrally has significantly increased in size compared to CT of 11/18/2016, measuring roughly 40 x 27 mm in the axial plane.  There is corresponding low level FDG hypermetabolism, max SUV  2.9.    There is no additional abnormal focal FDG hypermetabolism in the chest, specifically no enlarged or hypermetabolic mediastinal lymph nodes.  There is no abnormal focal FDG hypermetabolism elsewhere in the head, neck, abdomen, pelvis, or the skeletal system to suggest metastatic disease.    CT images of the brain show fairly extensive areas of nonspecific white matter hypoattenuation and small foci of encephalomalacia in both cerebral hemispheres.  There is no intra-axial mass or abnormal extra-axial fluid.  The calvarium is intact, with no suspicious sclerotic or lytic osseous abnormality.    CT images of the chest show no additional suspicious pulmonary nodules or masses.  There is left upper and lower lobe subsegmental atelectasis, with no pleural or pericardial effusion.  The heart is moderately enlarged, with aortic and coronary arterial vascular calcifications.    CT images of the abdomen and pelvis show several circumscribed hepatic hypodensities, non hypermetabolic, unchanged from prior CT of 11/18/2016.  Diffuse calcified plaque involves normal-caliber abdominal aorta and iliac arteries.  There is no ascites.    There are no suspicious sclerotic or lytic osseous abnormalities by CT.      Impression       1. Mildly hypermetabolic wedge-shaped opacity with associated consolidation in the right middle lobe centrally, compatible with provided history of adenocarcinoma.  2. No findings of regional metastatic disease in the chest, or extrathoracic metastatic disease.  3. Additional observations as above.      Electronically signed by: Brian Suarez MD  Date: 01/27/2020  Time: 15:01                         Primary adenocarcinoma of right lung        1.  Right middle lobe lung adenocarcinoma choice for treatment will be resection of right middle lobe malignancy being an adenocarcinoma verses radiotherapy and possibly chemosensitization.  I will await to speak with her daughter tomorrow.  Patient does not me to  speak to the daughter however the legal team here at [facility] or has reviewed the power of  and I a.m. clear to talk to the daughter.  acCording to other family members have spoken to over the phone patient does have findings to suggest dementia  2.  Staging workup indicated to include MRI of brain as well as PET-CT scan, add cea and labs   Time spent with patient : 50 minutes  Over 50% in counseling  Referral will be placed for radiation consult and further discussion can be made after she sees this physician  Thank you for allowing me to evaluate and participate in the care of this pleasant patient. Please fell free to call me with any questions or concerns.    Warmly,  Mariya Mercado MD    This note was dictated with Dragon and briefly proofread. Please excuse any sentences that may be unclear or words misspelled    Addendum  Spoke with daughter Ines: agree with radiation

## 2020-03-11 NOTE — TELEPHONE ENCOUNTER
Saw patient in clinic today with a representative from [facility].  Office visit was witnessed by nurse Honorio    Patient is adamant that I do not contact her daughter which she states is an  estranged family member.  Have reached out  [facility] legal team and will also reach out to the physician here in [place] who reported her as having I believe dementia.  He is actually the physician who rounds at her living facility called [facility].  I would just like to gather further clarification as this patient seems to be oriented and is not showing signs of dementia however I only see her for 50 min on office visit.

## 2020-03-11 NOTE — LETTER
March 12, 2020      Lucian Santillan MD  [id number]  [facility] [place] [address]           [facility]  [address] [place] [address]  Phone: [phone]          Patient: Franchesca Best   MR Number: [id number]   YOB: 1945   Date of Visit: 3/11/2020       Dear Dr. Lucian Santillan:    Thank you for referring Franchesca Best to me for evaluation. Attached you will find relevant portions of my assessment and plan of care.    If you have questions, please do not hesitate to call me. I look forward to following Franchesca Best along with you.    Sincerely,    Mariya Mercado MD    Enclosure  CC:  No Recipients    If you would like to receive this communication electronically, please contact [email or web address] or [phone] to request more information on [facility] access.    For providers and/or their staff who would like to refer a patient to [facility], please contact us through our one-stop-shop provider referral line, [facility], at [phone].    If you feel you have received this communication in error or would no longer like to receive these types of communications, please e-mail [email or web address]

## 2020-03-12 PROBLEM — C34.91 PRIMARY ADENOCARCINOMA OF RIGHT LUNG: Status: ACTIVE | Noted: 2020-03-12

## 2020-03-25 ENCOUNTER — APPOINTMENT (OUTPATIENT)
Dept: LAB | Facility: HOSPITAL | Age: 75
End: 2020-03-25
Attending: NURSE PRACTITIONER
Payer: MEDICARE

## 2020-03-25 DIAGNOSIS — N39.0 URINARY TRACT INFECTION, SITE NOT SPECIFIED: Primary | ICD-10-CM

## 2020-03-25 LAB
BACTERIA #/AREA URNS HPF: ABNORMAL /HPF
BILIRUB UR QL STRIP: NEGATIVE
CLARITY UR: CLEAR
COLOR UR: YELLOW
GLUCOSE UR QL STRIP: NEGATIVE
HGB UR QL STRIP: NEGATIVE
KETONES UR QL STRIP: NEGATIVE
LEUKOCYTE ESTERASE UR QL STRIP: ABNORMAL
MICROSCOPIC COMMENT: ABNORMAL
NITRITE UR QL STRIP: NEGATIVE
PH UR STRIP: 5 [PH] (ref 5–8)
PROT UR QL STRIP: NEGATIVE
SP GR UR STRIP: <=1.005 (ref 1–1.03)
URN SPEC COLLECT METH UR: ABNORMAL
UROBILINOGEN UR STRIP-ACNC: NEGATIVE EU/DL
WBC #/AREA URNS HPF: 20 /HPF (ref 0–5)

## 2020-03-25 PROCEDURE — 87088 URINE BACTERIA CULTURE: CPT

## 2020-03-25 PROCEDURE — 87086 URINE CULTURE/COLONY COUNT: CPT

## 2020-03-25 PROCEDURE — 81000 URINALYSIS NONAUTO W/SCOPE: CPT

## 2020-03-25 PROCEDURE — 87077 CULTURE AEROBIC IDENTIFY: CPT

## 2020-03-25 PROCEDURE — 87186 SC STD MICRODIL/AGAR DIL: CPT

## 2020-03-27 LAB — BACTERIA UR CULT: ABNORMAL

## 2020-05-19 ENCOUNTER — LAB VISIT (OUTPATIENT)
Dept: LAB | Facility: HOSPITAL | Age: 75
End: 2020-05-19
Attending: INTERNAL MEDICINE
Payer: MEDICARE

## 2020-05-19 DIAGNOSIS — E78.5 HYPERLIPEMIA: Primary | ICD-10-CM

## 2020-05-19 DIAGNOSIS — I50.9 HEART FAILURE, UNSPECIFIED: ICD-10-CM

## 2020-05-19 DIAGNOSIS — N18.30 CHRONIC KIDNEY DISEASE, STAGE III (MODERATE): ICD-10-CM

## 2020-05-19 LAB
ALBUMIN SERPL BCP-MCNC: 3.9 G/DL (ref 3.5–5.2)
ALP SERPL-CCNC: 64 U/L (ref 55–135)
ALT SERPL W/O P-5'-P-CCNC: 32 U/L (ref 10–44)
ANION GAP SERPL CALC-SCNC: 11 MMOL/L (ref 8–16)
AST SERPL-CCNC: 30 U/L (ref 10–40)
BILIRUB DIRECT SERPL-MCNC: 0.1 MG/DL (ref 0.1–0.3)
BILIRUB SERPL-MCNC: 0.5 MG/DL (ref 0.1–1)
BUN SERPL-MCNC: 17 MG/DL (ref 8–23)
CALCIUM SERPL-MCNC: 8.5 MG/DL (ref 8.7–10.5)
CHLORIDE SERPL-SCNC: 101 MMOL/L (ref 95–110)
CHOLEST SERPL-MCNC: 158 MG/DL (ref 120–199)
CHOLEST/HDLC SERPL: 2 {RATIO} (ref 2–5)
CO2 SERPL-SCNC: 26 MMOL/L (ref 23–29)
CREAT SERPL-MCNC: 0.8 MG/DL (ref 0.5–1.4)
EST. GFR  (AFRICAN AMERICAN): >60 ML/MIN/1.73 M^2
EST. GFR  (NON AFRICAN AMERICAN): >60 ML/MIN/1.73 M^2
GLUCOSE SERPL-MCNC: 81 MG/DL (ref 70–110)
HDLC SERPL-MCNC: 78 MG/DL (ref 40–75)
HDLC SERPL: 49.4 % (ref 20–50)
LDLC SERPL CALC-MCNC: 54.2 MG/DL (ref 63–159)
NONHDLC SERPL-MCNC: 80 MG/DL
PHENYTOIN SERPL-MCNC: 9.8 UG/ML (ref 10–20)
POTASSIUM SERPL-SCNC: 3.3 MMOL/L (ref 3.5–5.1)
PROT SERPL-MCNC: 6.5 G/DL (ref 6–8.4)
SODIUM SERPL-SCNC: 138 MMOL/L (ref 136–145)
TRIGL SERPL-MCNC: 129 MG/DL (ref 30–150)

## 2020-05-19 PROCEDURE — 36415 COLL VENOUS BLD VENIPUNCTURE: CPT

## 2020-05-19 PROCEDURE — 80076 HEPATIC FUNCTION PANEL: CPT

## 2020-05-19 PROCEDURE — 80048 BASIC METABOLIC PNL TOTAL CA: CPT

## 2020-05-19 PROCEDURE — 80061 LIPID PANEL: CPT

## 2020-05-19 PROCEDURE — 80185 ASSAY OF PHENYTOIN TOTAL: CPT

## 2020-05-26 ENCOUNTER — LAB VISIT (OUTPATIENT)
Dept: LAB | Facility: HOSPITAL | Age: 75
End: 2020-05-26
Attending: NURSE PRACTITIONER
Payer: MEDICARE

## 2020-05-26 DIAGNOSIS — E87.6 HYPOKALEMIA: Primary | ICD-10-CM

## 2020-05-26 LAB — POTASSIUM SERPL-SCNC: 3.6 MMOL/L (ref 3.5–5.1)

## 2020-05-26 PROCEDURE — 84132 ASSAY OF SERUM POTASSIUM: CPT

## 2020-05-26 PROCEDURE — 36415 COLL VENOUS BLD VENIPUNCTURE: CPT

## 2020-06-27 ENCOUNTER — LAB VISIT (OUTPATIENT)
Dept: LAB | Facility: HOSPITAL | Age: 75
End: 2020-06-27
Attending: INTERNAL MEDICINE
Payer: MEDICARE

## 2020-06-27 DIAGNOSIS — R41.0 DISORIENTATION: Primary | ICD-10-CM

## 2020-06-27 DIAGNOSIS — R77.8: ICD-10-CM

## 2020-06-27 LAB
ANION GAP SERPL CALC-SCNC: 13 MMOL/L (ref 8–16)
BUN SERPL-MCNC: 21 MG/DL (ref 8–23)
CALCIUM SERPL-MCNC: 9 MG/DL (ref 8.7–10.5)
CHLORIDE SERPL-SCNC: 98 MMOL/L (ref 95–110)
CO2 SERPL-SCNC: 26 MMOL/L (ref 23–29)
CREAT SERPL-MCNC: 1.3 MG/DL (ref 0.5–1.4)
ERYTHROCYTE [DISTWIDTH] IN BLOOD BY AUTOMATED COUNT: 13.4 % (ref 11.5–14.5)
EST. GFR  (AFRICAN AMERICAN): 46 ML/MIN/1.73 M^2
EST. GFR  (NON AFRICAN AMERICAN): 39.9 ML/MIN/1.73 M^2
GLUCOSE SERPL-MCNC: 116 MG/DL (ref 70–110)
HCT VFR BLD AUTO: 47.5 % (ref 37–48.5)
HGB BLD-MCNC: 15.6 G/DL (ref 12–16)
MCH RBC QN AUTO: 33.7 PG (ref 27–31)
MCHC RBC AUTO-ENTMCNC: 32.8 G/DL (ref 32–36)
MCV RBC AUTO: 103 FL (ref 82–98)
PLATELET # BLD AUTO: 275 K/UL (ref 150–350)
PMV BLD AUTO: 9.7 FL (ref 9.2–12.9)
POTASSIUM SERPL-SCNC: 4.3 MMOL/L (ref 3.5–5.1)
RBC # BLD AUTO: 4.63 M/UL (ref 4–5.4)
SODIUM SERPL-SCNC: 137 MMOL/L (ref 136–145)
WBC # BLD AUTO: 8.24 K/UL (ref 3.9–12.7)

## 2020-06-27 PROCEDURE — 85027 COMPLETE CBC AUTOMATED: CPT

## 2020-06-27 PROCEDURE — 36415 COLL VENOUS BLD VENIPUNCTURE: CPT

## 2020-06-27 PROCEDURE — 80048 BASIC METABOLIC PNL TOTAL CA: CPT

## 2020-06-30 ENCOUNTER — LAB VISIT (OUTPATIENT)
Dept: LAB | Facility: HOSPITAL | Age: 75
End: 2020-06-30
Attending: NURSE PRACTITIONER
Payer: MEDICARE

## 2020-06-30 DIAGNOSIS — I50.9 HEART FAILURE, UNSPECIFIED: ICD-10-CM

## 2020-06-30 DIAGNOSIS — R41.82 ALTERED MENTAL STATUS: Primary | ICD-10-CM

## 2020-06-30 LAB
AMMONIA PLAS-SCNC: 22 UMOL/L (ref 10–50)
BNP SERPL-MCNC: 220 PG/ML (ref 0–99)
MAGNESIUM SERPL-MCNC: 1.9 MG/DL (ref 1.6–2.6)
PHENYTOIN SERPL-MCNC: 5.6 UG/ML (ref 10–20)
PHOSPHATE SERPL-MCNC: 4 MG/DL (ref 2.7–4.5)

## 2020-06-30 PROCEDURE — 84100 ASSAY OF PHOSPHORUS: CPT

## 2020-06-30 PROCEDURE — 80185 ASSAY OF PHENYTOIN TOTAL: CPT

## 2020-06-30 PROCEDURE — 36415 COLL VENOUS BLD VENIPUNCTURE: CPT

## 2020-06-30 PROCEDURE — 83880 ASSAY OF NATRIURETIC PEPTIDE: CPT

## 2020-06-30 PROCEDURE — 83735 ASSAY OF MAGNESIUM: CPT

## 2020-06-30 PROCEDURE — 82140 ASSAY OF AMMONIA: CPT

## 2020-07-01 ENCOUNTER — APPOINTMENT (OUTPATIENT)
Dept: LAB | Facility: HOSPITAL | Age: 75
End: 2020-07-01
Attending: NURSE PRACTITIONER
Payer: MEDICARE

## 2020-07-01 DIAGNOSIS — R41.0 DISORIENTATION: Primary | ICD-10-CM

## 2020-07-01 LAB
BILIRUB UR QL STRIP: NEGATIVE
CLARITY UR: CLEAR
COLOR UR: YELLOW
GLUCOSE UR QL STRIP: NEGATIVE
HGB UR QL STRIP: NEGATIVE
KETONES UR QL STRIP: NEGATIVE
LEUKOCYTE ESTERASE UR QL STRIP: NEGATIVE
NITRITE UR QL STRIP: NEGATIVE
PH UR STRIP: 5 [PH] (ref 5–8)
PROT UR QL STRIP: NEGATIVE
SP GR UR STRIP: 1.01 (ref 1–1.03)
URN SPEC COLLECT METH UR: NORMAL
UROBILINOGEN UR STRIP-ACNC: NEGATIVE EU/DL

## 2020-07-01 PROCEDURE — 87086 URINE CULTURE/COLONY COUNT: CPT

## 2020-07-01 PROCEDURE — 81003 URINALYSIS AUTO W/O SCOPE: CPT

## 2020-07-03 LAB — BACTERIA UR CULT: NO GROWTH

## 2020-07-07 ENCOUNTER — LAB VISIT (OUTPATIENT)
Dept: LAB | Facility: HOSPITAL | Age: 75
End: 2020-07-07
Attending: INTERNAL MEDICINE
Payer: MEDICARE

## 2020-07-07 DIAGNOSIS — E87.6 HYPOPOTASSEMIA: Primary | ICD-10-CM

## 2020-07-07 LAB — POTASSIUM SERPL-SCNC: 3.5 MMOL/L (ref 3.5–5.1)

## 2020-07-07 PROCEDURE — 84132 ASSAY OF SERUM POTASSIUM: CPT

## 2020-07-07 PROCEDURE — 36415 COLL VENOUS BLD VENIPUNCTURE: CPT

## 2020-07-14 ENCOUNTER — LAB VISIT (OUTPATIENT)
Dept: LAB | Facility: HOSPITAL | Age: 75
End: 2020-07-14
Attending: NURSE PRACTITIONER
Payer: MEDICARE

## 2020-07-14 DIAGNOSIS — I10 HYPERTENSION: Primary | ICD-10-CM

## 2020-07-14 LAB — PHENYTOIN SERPL-MCNC: 9.6 UG/ML (ref 10–20)

## 2020-07-14 PROCEDURE — 36415 COLL VENOUS BLD VENIPUNCTURE: CPT

## 2020-07-14 PROCEDURE — 80185 ASSAY OF PHENYTOIN TOTAL: CPT

## 2020-08-13 ENCOUNTER — LAB VISIT (OUTPATIENT)
Dept: LAB | Facility: HOSPITAL | Age: 75
End: 2020-08-13
Attending: INTERNAL MEDICINE
Payer: MEDICARE

## 2020-08-13 DIAGNOSIS — I50.9 HEART FAILURE, UNSPECIFIED: Primary | ICD-10-CM

## 2020-08-13 DIAGNOSIS — N18.30 CHRONIC KIDNEY DISEASE, STAGE III (MODERATE): ICD-10-CM

## 2020-08-13 LAB
ALBUMIN SERPL BCP-MCNC: 4.3 G/DL (ref 3.5–5.2)
ALP SERPL-CCNC: 81 U/L (ref 55–135)
ALT SERPL W/O P-5'-P-CCNC: 28 U/L (ref 10–44)
ANION GAP SERPL CALC-SCNC: 13 MMOL/L (ref 8–16)
AST SERPL-CCNC: 30 U/L (ref 10–40)
BASOPHILS # BLD AUTO: 0.04 K/UL (ref 0–0.2)
BASOPHILS NFR BLD: 0.5 % (ref 0–1.9)
BILIRUB SERPL-MCNC: 0.6 MG/DL (ref 0.1–1)
BUN SERPL-MCNC: 22 MG/DL (ref 8–23)
CALCIUM SERPL-MCNC: 8.9 MG/DL (ref 8.7–10.5)
CHLORIDE SERPL-SCNC: 102 MMOL/L (ref 95–110)
CO2 SERPL-SCNC: 23 MMOL/L (ref 23–29)
CREAT SERPL-MCNC: 1 MG/DL (ref 0.5–1.4)
DIFFERENTIAL METHOD: ABNORMAL
EOSINOPHIL # BLD AUTO: 0.1 K/UL (ref 0–0.5)
EOSINOPHIL NFR BLD: 0.9 % (ref 0–8)
ERYTHROCYTE [DISTWIDTH] IN BLOOD BY AUTOMATED COUNT: 12.9 % (ref 11.5–14.5)
EST. GFR  (AFRICAN AMERICAN): >60 ML/MIN/1.73 M^2
EST. GFR  (NON AFRICAN AMERICAN): 54.5 ML/MIN/1.73 M^2
GLUCOSE SERPL-MCNC: 158 MG/DL (ref 70–110)
HCT VFR BLD AUTO: 44.3 % (ref 37–48.5)
HGB BLD-MCNC: 14.4 G/DL (ref 12–16)
IMM GRANULOCYTES # BLD AUTO: 0.03 K/UL (ref 0–0.04)
IMM GRANULOCYTES NFR BLD AUTO: 0.4 % (ref 0–0.5)
LYMPHOCYTES # BLD AUTO: 2.6 K/UL (ref 1–4.8)
LYMPHOCYTES NFR BLD: 31.9 % (ref 18–48)
MCH RBC QN AUTO: 33.3 PG (ref 27–31)
MCHC RBC AUTO-ENTMCNC: 32.5 G/DL (ref 32–36)
MCV RBC AUTO: 102 FL (ref 82–98)
MONOCYTES # BLD AUTO: 0.6 K/UL (ref 0.3–1)
MONOCYTES NFR BLD: 6.7 % (ref 4–15)
NEUTROPHILS # BLD AUTO: 4.9 K/UL (ref 1.8–7.7)
NEUTROPHILS NFR BLD: 59.6 % (ref 38–73)
NRBC BLD-RTO: 0 /100 WBC
PHENYTOIN SERPL-MCNC: 7.2 UG/ML (ref 10–20)
PLATELET # BLD AUTO: 253 K/UL (ref 150–350)
PMV BLD AUTO: 10 FL (ref 9.2–12.9)
POTASSIUM SERPL-SCNC: 3.4 MMOL/L (ref 3.5–5.1)
PROT SERPL-MCNC: 7.4 G/DL (ref 6–8.4)
RBC # BLD AUTO: 4.33 M/UL (ref 4–5.4)
SODIUM SERPL-SCNC: 138 MMOL/L (ref 136–145)
WBC # BLD AUTO: 8.18 K/UL (ref 3.9–12.7)

## 2020-08-13 PROCEDURE — 36415 COLL VENOUS BLD VENIPUNCTURE: CPT

## 2020-08-13 PROCEDURE — 80185 ASSAY OF PHENYTOIN TOTAL: CPT

## 2020-08-13 PROCEDURE — 80053 COMPREHEN METABOLIC PANEL: CPT

## 2020-08-13 PROCEDURE — 85025 COMPLETE CBC W/AUTO DIFF WBC: CPT

## 2020-08-19 ENCOUNTER — LAB VISIT (OUTPATIENT)
Dept: LAB | Facility: HOSPITAL | Age: 75
End: 2020-08-19
Attending: NURSE PRACTITIONER
Payer: MEDICARE

## 2020-08-19 DIAGNOSIS — E78.5 HYPERLIPEMIA: Primary | ICD-10-CM

## 2020-08-19 DIAGNOSIS — E11.9 DIABETES MELLITUS WITHOUT COMPLICATION: ICD-10-CM

## 2020-08-19 LAB
ESTIMATED AVG GLUCOSE: 117 MG/DL (ref 68–131)
HBA1C MFR BLD HPLC: 5.7 % (ref 4.5–6.2)
POTASSIUM SERPL-SCNC: 3.3 MMOL/L (ref 3.5–5.1)

## 2020-08-19 PROCEDURE — 36415 COLL VENOUS BLD VENIPUNCTURE: CPT

## 2020-08-19 PROCEDURE — 84132 ASSAY OF SERUM POTASSIUM: CPT

## 2020-08-19 PROCEDURE — 83036 HEMOGLOBIN GLYCOSYLATED A1C: CPT

## 2020-09-11 ENCOUNTER — LAB VISIT (OUTPATIENT)
Dept: LAB | Facility: HOSPITAL | Age: 75
End: 2020-09-11
Attending: INTERNAL MEDICINE
Payer: MEDICARE

## 2020-09-11 DIAGNOSIS — E87.6 HYPOPOTASSEMIA: Primary | ICD-10-CM

## 2020-09-11 DIAGNOSIS — G40.109 SPECIAL SENSORY ATTACKS: ICD-10-CM

## 2020-09-11 LAB
PHENYTOIN SERPL-MCNC: 4.6 UG/ML (ref 10–20)
POTASSIUM SERPL-SCNC: 4.4 MMOL/L (ref 3.5–5.1)

## 2020-09-11 PROCEDURE — 84132 ASSAY OF SERUM POTASSIUM: CPT

## 2020-09-11 PROCEDURE — 80185 ASSAY OF PHENYTOIN TOTAL: CPT

## 2020-09-11 PROCEDURE — 36415 COLL VENOUS BLD VENIPUNCTURE: CPT
